# Patient Record
Sex: MALE | Race: OTHER
[De-identification: names, ages, dates, MRNs, and addresses within clinical notes are randomized per-mention and may not be internally consistent; named-entity substitution may affect disease eponyms.]

---

## 2019-01-25 ENCOUNTER — LABORATORY RESULT (OUTPATIENT)
Age: 34
End: 2019-01-25

## 2019-01-25 ENCOUNTER — APPOINTMENT (OUTPATIENT)
Dept: INTERNAL MEDICINE | Facility: CLINIC | Age: 34
End: 2019-01-25

## 2019-01-25 ENCOUNTER — OUTPATIENT (OUTPATIENT)
Dept: OUTPATIENT SERVICES | Facility: HOSPITAL | Age: 34
LOS: 1 days | Discharge: HOME | End: 2019-01-25

## 2019-01-25 VITALS
WEIGHT: 194 LBS | HEART RATE: 66 BPM | DIASTOLIC BLOOD PRESSURE: 85 MMHG | SYSTOLIC BLOOD PRESSURE: 135 MMHG | HEIGHT: 62 IN | BODY MASS INDEX: 35.7 KG/M2

## 2019-01-25 DIAGNOSIS — Z78.9 OTHER SPECIFIED HEALTH STATUS: ICD-10-CM

## 2019-01-25 DIAGNOSIS — Z23 ENCOUNTER FOR IMMUNIZATION: ICD-10-CM

## 2019-01-25 DIAGNOSIS — G62.9 POLYNEUROPATHY, UNSPECIFIED: ICD-10-CM

## 2019-01-25 DIAGNOSIS — R53.83 OTHER FATIGUE: ICD-10-CM

## 2019-01-25 DIAGNOSIS — L85.3 XEROSIS CUTIS: ICD-10-CM

## 2019-01-25 DIAGNOSIS — Z00.00 ENCOUNTER FOR GENERAL ADULT MEDICAL EXAMINATION WITHOUT ABNORMAL FINDINGS: ICD-10-CM

## 2019-01-25 NOTE — REVIEW OF SYSTEMS
[Negative] : Heme/Lymph [Skin Rash] : skin rash [FreeTextEntry2] : lethargy [de-identified] : peripheral neuropathy

## 2019-01-25 NOTE — ASSESSMENT
[FreeTextEntry1] : 33 y.o. f presents for initial evaluation. Patient presents with a chief complaint of dry skin on head, around penis, and butt crack x6 months.\par Patient also admits to being very lethargic lately x1 month. It is associated with bilateral feet swelling. Patient also admits to some shortness of breath on exertion. In addition, also complains of bilateral foot numbness with burning that also started at the same time.\par \par #) Dry skin likely psoriasis\par -Derm referral given\par -clobetasol propionate 0.05% given for scalp to be applied at bedtime x10 days\par -lotrisone given for intertriginous areas to be applied daily x10 days\par \par #) Lethargy/ SOB a/w exertion/ b/l foot swelling\par -STOP -BANG- snores, tired, observed episodes of apnea, neck circumference>15cm, male- 5 points indicative of high risk\par -pulm referral given for sleep study to r/o RITA\par -2D echo ordered to r/o regional wall abnormalities\par -EKG ordered\par -CXR ordered\par \par #) Numbness in b/l feet\par -A1c ordered to r/o diabetic peripheral neuropathy\par -B12, folate ordered to r/o cause of neuropathy\par -neuro exam- sensation b/l intact, reflexes 2+ b/l, strength 5/5 in all extremities\par \par #) HCM\par -BP- 135/85\par -flu shot given\par -routine b/w ordered- cbc, cmp, a1c, tsh, lipid profile, and vitamin D\par -RTC in 3 months

## 2019-01-25 NOTE — PHYSICAL EXAM
[Normal] : normal gait, coordination grossly intact, no focal deficits [de-identified] : salmon pink and scaly rash at buttocks, fingers, and around penis [de-identified] : sensation b/l intact, reflexes 2+ b/l, strength 5/5 in RUE/RLE/LUE/LLE [de-identified] : appropriate, cooperative

## 2019-01-25 NOTE — HISTORY OF PRESENT ILLNESS
[de-identified] : 33 y.o. f presents for initial evaluation. Patient presents with a chief complaint of dry skin on head, around penis, and butt crack. This started about 6 months ago, started with his hands. 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 The rash then popped up in the head, around penis, and in buttocks. Patient also admits to itchiness and redness in area. This has never happened before. Patient denies anyone in the family with a similar rash. The rash has been progressively worsening, spreading to different areas and getting bigger. \par \par Patient also admits to being very lethargic lately. This started about a month ago. It is associated with bilateral feet swelling. Patient also admits to some shortness of breath on exertion. In addition, also complains of bilateral foot numbness with burning that also started at the same time.\par \par Patient denies any fevers, chills, night sweats, and unintentional weight loss. Patient denies any chest pain, shortness of breath, and palpitations. Patient denies any nausea, vomiting, and diarrhea. Patient denies any unintentional weight loss.

## 2019-01-28 LAB
25(OH)D3 SERPL-MCNC: 15 NG/ML
ALBUMIN SERPL ELPH-MCNC: 4.3 G/DL
ALP BLD-CCNC: 100 U/L
ALT SERPL-CCNC: 50 U/L
ANION GAP SERPL CALC-SCNC: 13 MMOL/L
AST SERPL-CCNC: 25 U/L
BASOPHILS # BLD AUTO: 0.04 K/UL
BASOPHILS NFR BLD AUTO: 0.4 %
BILIRUB SERPL-MCNC: 0.3 MG/DL
BUN SERPL-MCNC: 13 MG/DL
CALCIUM SERPL-MCNC: 9.3 MG/DL
CHLORIDE SERPL-SCNC: 100 MMOL/L
CHOLEST SERPL-MCNC: 185 MG/DL
CHOLEST/HDLC SERPL: 5.4 RATIO
CO2 SERPL-SCNC: 29 MMOL/L
CREAT SERPL-MCNC: 0.8 MG/DL
EOSINOPHIL # BLD AUTO: 0.16 K/UL
EOSINOPHIL NFR BLD AUTO: 1.8 %
FOLATE SERPL-MCNC: 14.1 NG/ML
GLUCOSE SERPL-MCNC: 108 MG/DL
HCT VFR BLD CALC: 47.6 %
HDLC SERPL-MCNC: 34 MG/DL
HGB BLD-MCNC: 15.5 G/DL
IMM GRANULOCYTES NFR BLD AUTO: 0.6 %
LDLC SERPL CALC-MCNC: 131 MG/DL
LYMPHOCYTES # BLD AUTO: 2.65 K/UL
LYMPHOCYTES NFR BLD AUTO: 29.6 %
MAN DIFF?: NORMAL
MCHC RBC-ENTMCNC: 29.5 PG
MCHC RBC-ENTMCNC: 32.6 G/DL
MCV RBC AUTO: 90.5 FL
MONOCYTES # BLD AUTO: 0.43 K/UL
MONOCYTES NFR BLD AUTO: 4.8 %
NEUTROPHILS # BLD AUTO: 5.61 K/UL
NEUTROPHILS NFR BLD AUTO: 62.8 %
PLATELET # BLD AUTO: 330 K/UL
POTASSIUM SERPL-SCNC: 4.7 MMOL/L
PROT SERPL-MCNC: 6.9 G/DL
RBC # BLD: 5.26 M/UL
RBC # FLD: 13.1 %
SODIUM SERPL-SCNC: 142 MMOL/L
TRIGL SERPL-MCNC: 278 MG/DL
VIT B12 SERPL-MCNC: 1086 PG/ML
WBC # FLD AUTO: 8.94 K/UL

## 2019-04-26 ENCOUNTER — APPOINTMENT (OUTPATIENT)
Dept: PULMONOLOGY | Facility: CLINIC | Age: 34
End: 2019-04-26

## 2019-04-30 ENCOUNTER — APPOINTMENT (OUTPATIENT)
Dept: INTERNAL MEDICINE | Facility: CLINIC | Age: 34
End: 2019-04-30

## 2019-05-14 ENCOUNTER — APPOINTMENT (OUTPATIENT)
Dept: DERMATOLOGY | Facility: CLINIC | Age: 34
End: 2019-05-14

## 2021-05-17 ENCOUNTER — APPOINTMENT (OUTPATIENT)
Dept: INTERNAL MEDICINE | Facility: CLINIC | Age: 36
End: 2021-05-17
Payer: SUBSIDIZED

## 2021-05-17 ENCOUNTER — OUTPATIENT (OUTPATIENT)
Dept: OUTPATIENT SERVICES | Facility: HOSPITAL | Age: 36
LOS: 1 days | Discharge: HOME | End: 2021-05-17

## 2021-05-17 VITALS
HEART RATE: 73 BPM | WEIGHT: 163 LBS | HEIGHT: 62 IN | OXYGEN SATURATION: 98 % | SYSTOLIC BLOOD PRESSURE: 122 MMHG | BODY MASS INDEX: 30 KG/M2 | TEMPERATURE: 97.5 F | DIASTOLIC BLOOD PRESSURE: 79 MMHG

## 2021-05-17 DIAGNOSIS — Z78.9 OTHER SPECIFIED HEALTH STATUS: ICD-10-CM

## 2021-05-17 DIAGNOSIS — Z83.3 FAMILY HISTORY OF DIABETES MELLITUS: ICD-10-CM

## 2021-05-17 PROCEDURE — 99213 OFFICE O/P EST LOW 20 MIN: CPT | Mod: GC

## 2021-05-17 RX ORDER — KETOCONAZOLE 20.5 MG/ML
2 SHAMPOO, SUSPENSION TOPICAL
Qty: 1 | Refills: 3 | Status: ACTIVE | COMMUNITY
Start: 2021-05-17 | End: 1900-01-01

## 2021-05-17 RX ORDER — KETOCONAZOLE 20 MG/G
2 CREAM TOPICAL TWICE DAILY
Qty: 1 | Refills: 1 | Status: ACTIVE | COMMUNITY
Start: 2021-05-17 | End: 1900-01-01

## 2021-05-19 DIAGNOSIS — Z83.3 FAMILY HISTORY OF DIABETES MELLITUS: ICD-10-CM

## 2021-05-19 DIAGNOSIS — L21.9 SEBORRHEIC DERMATITIS, UNSPECIFIED: ICD-10-CM

## 2021-05-19 DIAGNOSIS — Z00.00 ENCOUNTER FOR GENERAL ADULT MEDICAL EXAMINATION WITHOUT ABNORMAL FINDINGS: ICD-10-CM

## 2021-05-19 NOTE — PLAN
[FreeTextEntry1] : #) seborrheic dermatitis\par -Ketocanozole shampoo 2% prescribed \par -ketocanozole cream 2% given for intertriginous areas and around penis \par \par #) Low back pain \par - can use OTC motrin and stretching exercise after work\par -rtc in 3 weeks for follow up

## 2021-05-19 NOTE — PHYSICAL EXAM
[No Acute Distress] : no acute distress [Well Nourished] : well nourished [Well Developed] : well developed [No Respiratory Distress] : no respiratory distress  [No Accessory Muscle Use] : no accessory muscle use [Clear to Auscultation] : lungs were clear to auscultation bilaterally [Normal Rate] : normal rate  [Regular Rhythm] : with a regular rhythm [de-identified] : sensation b/l intact, reflexes 2+ b/l, strength 5/5 in RUE/RLE/LUE/LLE [de-identified] : appropriate, cooperative [Normal] : no CVA or spinal tenderness [de-identified] : red flaky rash on scalp and erythema on glans penis

## 2021-05-19 NOTE — ASSESSMENT
[FreeTextEntry1] : 35 y.o. male presents medical follow up with a complaint of red itchy flaky rash on scalp and on penis . This problem is recurrent for patient and reappeared about 1 month ago.\par \par He also complains of lower back back pain.  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 \par \par \par \par #) seborrheic dermatitis\par -Ketocanozole shampoo 2% prescribed \par -ketocanozole cream 2% given for glans penis \par \par #) Low back pain \par - can use OTC motrin \par - Patient counseled on stretching exercises after work\par -rtc in 3 weeks for follow up

## 2021-05-19 NOTE — HISTORY OF PRESENT ILLNESS
[FreeTextEntry1] : follow up  [de-identified] : 35 y.o. male presents medical follow up with a complaint of red itchy flaky rash on scalp and on penis . This problem is recurrent for patient and reappeared about 1 month ago.\par \par He also complains of lower back back pain.  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

## 2021-06-02 LAB
ALBUMIN SERPL ELPH-MCNC: 3.8 G/DL
ALP BLD-CCNC: 120 U/L
ALT SERPL-CCNC: <5 U/L
ANION GAP SERPL CALC-SCNC: 12 MMOL/L
AST SERPL-CCNC: <5 U/L
BASOPHILS # BLD AUTO: 0.03 K/UL
BASOPHILS NFR BLD AUTO: 0.4 %
BILIRUB SERPL-MCNC: 0.3 MG/DL
BUN SERPL-MCNC: 15 MG/DL
CALCIUM SERPL-MCNC: 9.3 MG/DL
CHLORIDE SERPL-SCNC: 95 MMOL/L
CHOLEST SERPL-MCNC: 381 MG/DL
CO2 SERPL-SCNC: 25 MMOL/L
CREAT SERPL-MCNC: <0.5 MG/DL
EOSINOPHIL # BLD AUTO: 0.17 K/UL
EOSINOPHIL NFR BLD AUTO: 2.2 %
ESTIMATED AVERAGE GLUCOSE: 306 MG/DL
GLUCOSE SERPL-MCNC: 344 MG/DL
HBA1C MFR BLD HPLC: 12.3 %
HCT VFR BLD CALC: 44.8 %
HDLC SERPL-MCNC: 18 MG/DL
HGB BLD-MCNC: 16.5 G/DL
IMM GRANULOCYTES NFR BLD AUTO: 0.5 %
LDLC SERPL CALC-MCNC: 42 MG/DL
LYMPHOCYTES # BLD AUTO: 2.83 K/UL
LYMPHOCYTES NFR BLD AUTO: 37.2 %
MAN DIFF?: NORMAL
MCHC RBC-ENTMCNC: 32 PG
MCHC RBC-ENTMCNC: 36.8 G/DL
MCV RBC AUTO: 87 FL
MONOCYTES # BLD AUTO: 0.46 K/UL
MONOCYTES NFR BLD AUTO: 6.1 %
NEUTROPHILS # BLD AUTO: 4.07 K/UL
NEUTROPHILS NFR BLD AUTO: 53.6 %
NONHDLC SERPL-MCNC: 363 MG/DL
PLATELET # BLD AUTO: 290 K/UL
POTASSIUM SERPL-SCNC: 4.5 MMOL/L
PROT SERPL-MCNC: 6.7 G/DL
RBC # BLD: 5.15 M/UL
RBC # FLD: 13.1 %
SODIUM SERPL-SCNC: 132 MMOL/L
T4 FREE SERPL-MCNC: 1.3 NG/DL
TRIGL SERPL-MCNC: 3998 MG/DL
TSH SERPL-ACNC: 3.72 UIU/ML
WBC # FLD AUTO: 7.6 K/UL

## 2021-06-16 ENCOUNTER — APPOINTMENT (OUTPATIENT)
Dept: INTERNAL MEDICINE | Facility: CLINIC | Age: 36
End: 2021-06-16
Payer: SUBSIDIZED

## 2021-06-16 ENCOUNTER — NON-APPOINTMENT (OUTPATIENT)
Age: 36
End: 2021-06-16

## 2021-06-16 ENCOUNTER — OUTPATIENT (OUTPATIENT)
Dept: OUTPATIENT SERVICES | Facility: HOSPITAL | Age: 36
LOS: 1 days | Discharge: HOME | End: 2021-06-16

## 2021-06-16 VITALS
WEIGHT: 163 LBS | HEIGHT: 62 IN | TEMPERATURE: 96.8 F | OXYGEN SATURATION: 97 % | BODY MASS INDEX: 30 KG/M2 | DIASTOLIC BLOOD PRESSURE: 70 MMHG | SYSTOLIC BLOOD PRESSURE: 110 MMHG | HEART RATE: 77 BPM

## 2021-06-16 DIAGNOSIS — E78.5 HYPERLIPIDEMIA, UNSPECIFIED: ICD-10-CM

## 2021-06-16 PROCEDURE — 99213 OFFICE O/P EST LOW 20 MIN: CPT | Mod: GC

## 2021-06-16 RX ORDER — CLOBETASOL PROPIONATE 0.5 MG/G
0.05 CREAM TOPICAL TWICE DAILY
Qty: 1 | Refills: 0 | Status: COMPLETED | COMMUNITY
Start: 2019-01-25 | End: 2021-06-16

## 2021-06-16 RX ORDER — CLOTRIMAZOLE AND BETAMETHASONE DIPROPIONATE 10; .5 MG/G; MG/G
1-0.05 CREAM TOPICAL
Qty: 1 | Refills: 0 | Status: COMPLETED | COMMUNITY
Start: 2019-01-25 | End: 2021-06-16

## 2021-06-17 DIAGNOSIS — E78.5 HYPERLIPIDEMIA, UNSPECIFIED: ICD-10-CM

## 2021-06-17 DIAGNOSIS — E11.9 TYPE 2 DIABETES MELLITUS WITHOUT COMPLICATIONS: ICD-10-CM

## 2021-06-23 LAB
CHOLEST SERPL-MCNC: 276 MG/DL
ESTIMATED AVERAGE GLUCOSE: 309 MG/DL
HBA1C MFR BLD HPLC: 12.4 %
HDLC SERPL-MCNC: 22 MG/DL
LDLC SERPL CALC-MCNC: 62 MG/DL
NONHDLC SERPL-MCNC: 254 MG/DL
TRIGL SERPL-MCNC: 2070 MG/DL

## 2021-06-30 NOTE — PHYSICAL EXAM
[Well Nourished] : well nourished [No Acute Distress] : no acute distress [Well Developed] : well developed [No Respiratory Distress] : no respiratory distress  [No Accessory Muscle Use] : no accessory muscle use [Clear to Auscultation] : lungs were clear to auscultation bilaterally [Normal Rate] : normal rate  [Regular Rhythm] : with a regular rhythm [Normal] : no CVA or spinal tenderness [de-identified] : red flaky rash on scalp and erythema on glans penis

## 2021-06-30 NOTE — PHYSICAL EXAM
[Well Nourished] : well nourished [No Acute Distress] : no acute distress [Well Developed] : well developed [No Respiratory Distress] : no respiratory distress  [No Accessory Muscle Use] : no accessory muscle use [Clear to Auscultation] : lungs were clear to auscultation bilaterally [Normal Rate] : normal rate  [Regular Rhythm] : with a regular rhythm [Normal] : no CVA or spinal tenderness [de-identified] : red flaky rash on scalp and erythema on glans penis

## 2021-06-30 NOTE — HISTORY OF PRESENT ILLNESS
[FreeTextEntry1] : medical follow up [de-identified] : 36 y.o. male presents medical follow up with  complaint of red itchy flaky rash on scalp and on penis It has improved since last visit, but did not disappear completely\par \par Patient is here to discuss lab results.

## 2021-06-30 NOTE — ASSESSMENT
[FreeTextEntry1] : 36 y.o. male presents medical follow up with a complaint of red itchy flaky rash on scalp and on penis improved with ketoconazole.\par  \par \par #) seborrheic dermatitis\par -Ketocanozole shampoo 2% prescribed \par -ketocanozole cream 2% \par Refer to dermatology\par \par Labs noted. \par Hgb A1C is 12.3 Hgb A1C was 6.4 in 2019\par Trigl 3997 mg/dl\par Chol 381\par HDL 18\par \par Patient will be repeating his labs this week. If results are true, we will start treatment for DM and hyperlipidemia.

## 2021-06-30 NOTE — HISTORY OF PRESENT ILLNESS
[FreeTextEntry1] : medical follow up [de-identified] : 36 y.o. male presents medical follow up with  complaint of red itchy flaky rash on scalp and on penis It has improved since last visit, but did not disappear completely\par \par Patient is here to discuss lab results.

## 2021-07-01 PROBLEM — E78.5 HYPERLIPIDEMIA: Status: ACTIVE | Noted: 2021-07-01

## 2021-07-02 ENCOUNTER — NON-APPOINTMENT (OUTPATIENT)
Age: 36
End: 2021-07-02

## 2021-07-06 ENCOUNTER — NON-APPOINTMENT (OUTPATIENT)
Age: 36
End: 2021-07-06

## 2021-07-08 ENCOUNTER — NON-APPOINTMENT (OUTPATIENT)
Age: 36
End: 2021-07-08

## 2021-07-09 ENCOUNTER — APPOINTMENT (OUTPATIENT)
Dept: NUTRITION | Facility: CLINIC | Age: 36
End: 2021-07-09

## 2021-09-23 ENCOUNTER — OUTPATIENT (OUTPATIENT)
Dept: OUTPATIENT SERVICES | Facility: HOSPITAL | Age: 36
LOS: 1 days | Discharge: HOME | End: 2021-09-23

## 2021-09-23 DIAGNOSIS — Z20.828 CONTACT WITH AND (SUSPECTED) EXPOSURE TO OTHER VIRAL COMMUNICABLE DISEASES: ICD-10-CM

## 2021-09-23 LAB — SARS-COV-2 RNA SPEC QL NAA+PROBE: SIGNIFICANT CHANGE UP

## 2021-10-04 ENCOUNTER — APPOINTMENT (OUTPATIENT)
Dept: INTERNAL MEDICINE | Facility: CLINIC | Age: 36
End: 2021-10-04

## 2021-10-12 ENCOUNTER — APPOINTMENT (OUTPATIENT)
Dept: DERMATOLOGY | Facility: CLINIC | Age: 36
End: 2021-10-12

## 2022-03-28 ENCOUNTER — APPOINTMENT (OUTPATIENT)
Dept: NUTRITION | Facility: CLINIC | Age: 37
End: 2022-03-28

## 2022-04-10 ENCOUNTER — EMERGENCY (EMERGENCY)
Facility: HOSPITAL | Age: 37
LOS: 1 days | Discharge: HOME | End: 2022-04-12
Attending: EMERGENCY MEDICINE | Admitting: EMERGENCY MEDICINE
Payer: SUBSIDIZED

## 2022-04-10 VITALS
HEART RATE: 73 BPM | DIASTOLIC BLOOD PRESSURE: 86 MMHG | WEIGHT: 156.97 LBS | SYSTOLIC BLOOD PRESSURE: 141 MMHG | OXYGEN SATURATION: 99 % | RESPIRATION RATE: 18 BRPM | TEMPERATURE: 98 F

## 2022-04-10 DIAGNOSIS — R11.0 NAUSEA: ICD-10-CM

## 2022-04-10 DIAGNOSIS — R07.89 OTHER CHEST PAIN: ICD-10-CM

## 2022-04-10 DIAGNOSIS — E78.00 PURE HYPERCHOLESTEROLEMIA, UNSPECIFIED: ICD-10-CM

## 2022-04-10 DIAGNOSIS — R94.31 ABNORMAL ELECTROCARDIOGRAM [ECG] [EKG]: ICD-10-CM

## 2022-04-10 DIAGNOSIS — Z20.822 CONTACT WITH AND (SUSPECTED) EXPOSURE TO COVID-19: ICD-10-CM

## 2022-04-10 DIAGNOSIS — R42 DIZZINESS AND GIDDINESS: ICD-10-CM

## 2022-04-10 DIAGNOSIS — E11.65 TYPE 2 DIABETES MELLITUS WITH HYPERGLYCEMIA: ICD-10-CM

## 2022-04-10 LAB
BASOPHILS # BLD AUTO: 0.02 K/UL — SIGNIFICANT CHANGE UP (ref 0–0.2)
BASOPHILS NFR BLD AUTO: 0.3 % — SIGNIFICANT CHANGE UP (ref 0–1)
EOSINOPHIL # BLD AUTO: 0.14 K/UL — SIGNIFICANT CHANGE UP (ref 0–0.7)
EOSINOPHIL NFR BLD AUTO: 2 % — SIGNIFICANT CHANGE UP (ref 0–8)
HCT VFR BLD CALC: 42.7 % — SIGNIFICANT CHANGE UP (ref 42–52)
HGB BLD-MCNC: 14.9 G/DL — SIGNIFICANT CHANGE UP (ref 14–18)
IMM GRANULOCYTES NFR BLD AUTO: 0.3 % — SIGNIFICANT CHANGE UP (ref 0.1–0.3)
INR BLD: 1.01 RATIO — SIGNIFICANT CHANGE UP (ref 0.65–1.3)
LYMPHOCYTES # BLD AUTO: 2.73 K/UL — SIGNIFICANT CHANGE UP (ref 1.2–3.4)
LYMPHOCYTES # BLD AUTO: 39.3 % — SIGNIFICANT CHANGE UP (ref 20.5–51.1)
MCHC RBC-ENTMCNC: 30.7 PG — SIGNIFICANT CHANGE UP (ref 27–31)
MCHC RBC-ENTMCNC: 34.9 G/DL — SIGNIFICANT CHANGE UP (ref 32–37)
MCV RBC AUTO: 87.9 FL — SIGNIFICANT CHANGE UP (ref 80–94)
MONOCYTES # BLD AUTO: 0.31 K/UL — SIGNIFICANT CHANGE UP (ref 0.1–0.6)
MONOCYTES NFR BLD AUTO: 4.5 % — SIGNIFICANT CHANGE UP (ref 1.7–9.3)
NEUTROPHILS # BLD AUTO: 3.73 K/UL — SIGNIFICANT CHANGE UP (ref 1.4–6.5)
NEUTROPHILS NFR BLD AUTO: 53.6 % — SIGNIFICANT CHANGE UP (ref 42.2–75.2)
NRBC # BLD: 0 /100 WBCS — SIGNIFICANT CHANGE UP (ref 0–0)
PLATELET # BLD AUTO: 271 K/UL — SIGNIFICANT CHANGE UP (ref 130–400)
PROTHROM AB SERPL-ACNC: 11.6 SEC — SIGNIFICANT CHANGE UP (ref 9.95–12.87)
RBC # BLD: 4.86 M/UL — SIGNIFICANT CHANGE UP (ref 4.7–6.1)
RBC # FLD: 12.4 % — SIGNIFICANT CHANGE UP (ref 11.5–14.5)
WBC # BLD: 6.95 K/UL — SIGNIFICANT CHANGE UP (ref 4.8–10.8)
WBC # FLD AUTO: 6.95 K/UL — SIGNIFICANT CHANGE UP (ref 4.8–10.8)

## 2022-04-10 PROCEDURE — 99285 EMERGENCY DEPT VISIT HI MDM: CPT

## 2022-04-10 PROCEDURE — 99283 EMERGENCY DEPT VISIT LOW MDM: CPT

## 2022-04-10 RX ORDER — SODIUM CHLORIDE 9 MG/ML
1000 INJECTION INTRAMUSCULAR; INTRAVENOUS; SUBCUTANEOUS ONCE
Refills: 0 | Status: COMPLETED | OUTPATIENT
Start: 2022-04-10 | End: 2022-04-10

## 2022-04-10 RX ORDER — MECLIZINE HCL 12.5 MG
50 TABLET ORAL ONCE
Refills: 0 | Status: COMPLETED | OUTPATIENT
Start: 2022-04-10 | End: 2022-04-10

## 2022-04-10 RX ADMIN — SODIUM CHLORIDE 1000 MILLILITER(S): 9 INJECTION INTRAMUSCULAR; INTRAVENOUS; SUBCUTANEOUS at 23:21

## 2022-04-10 RX ADMIN — Medication 50 MILLIGRAM(S): at 23:24

## 2022-04-10 NOTE — ED ADULT TRIAGE NOTE - CHIEF COMPLAINT QUOTE
pt has dizziness described as everything spinning around him aw nausea and cold sweats, sleepiness staring yesterday denies abd pain, vision changes, fevers.

## 2022-04-10 NOTE — ED ADULT NURSE NOTE - OBJECTIVE STATEMENT
patient complaints of dizziness and nausea x 1 day. Patient denies chest pain, vomiting, fever, SOB.

## 2022-04-10 NOTE — ED ADULT TRIAGE NOTE - ESI TRIAGE ACUITY LEVEL, MLM
3
Quality 110: Preventive Care And Screening: Influenza Immunization: Influenza Immunization not Administered because Patient Refused.
Detail Level: Detailed

## 2022-04-11 LAB
A1C WITH ESTIMATED AVERAGE GLUCOSE RESULT: 11.9 % — HIGH (ref 4–5.6)
ALBUMIN SERPL ELPH-MCNC: 4.2 G/DL — SIGNIFICANT CHANGE UP (ref 3.5–5.2)
ALP SERPL-CCNC: 98 U/L — SIGNIFICANT CHANGE UP (ref 30–115)
ALT FLD-CCNC: 36 U/L — SIGNIFICANT CHANGE UP (ref 0–41)
ANION GAP SERPL CALC-SCNC: 9 MMOL/L — SIGNIFICANT CHANGE UP (ref 7–14)
APTT BLD: 32.2 SEC — SIGNIFICANT CHANGE UP (ref 27–39.2)
AST SERPL-CCNC: 17 U/L — SIGNIFICANT CHANGE UP (ref 0–41)
BILIRUB DIRECT SERPL-MCNC: <0.2 MG/DL — SIGNIFICANT CHANGE UP (ref 0–0.3)
BILIRUB INDIRECT FLD-MCNC: SIGNIFICANT CHANGE UP MG/DL (ref 0.2–1.2)
BILIRUB SERPL-MCNC: 0.3 MG/DL — SIGNIFICANT CHANGE UP (ref 0.2–1.2)
BUN SERPL-MCNC: 16 MG/DL — SIGNIFICANT CHANGE UP (ref 10–20)
CALCIUM SERPL-MCNC: 9.4 MG/DL — SIGNIFICANT CHANGE UP (ref 8.5–10.1)
CHLORIDE SERPL-SCNC: 99 MMOL/L — SIGNIFICANT CHANGE UP (ref 98–110)
CHOLEST SERPL-MCNC: 249 MG/DL — HIGH
CO2 SERPL-SCNC: 27 MMOL/L — SIGNIFICANT CHANGE UP (ref 17–32)
CREAT SERPL-MCNC: 0.6 MG/DL — LOW (ref 0.7–1.5)
D DIMER BLD IA.RAPID-MCNC: <150 NG/ML DDU — SIGNIFICANT CHANGE UP (ref 0–230)
EGFR: 129 ML/MIN/1.73M2 — SIGNIFICANT CHANGE UP
ESTIMATED AVERAGE GLUCOSE: 295 MG/DL — HIGH (ref 68–114)
GLUCOSE SERPL-MCNC: 305 MG/DL — HIGH (ref 70–99)
HDLC SERPL-MCNC: 27 MG/DL — LOW
LIPID PNL WITH DIRECT LDL SERPL: 100 MG/DL — HIGH
MAGNESIUM SERPL-MCNC: 2 MG/DL — SIGNIFICANT CHANGE UP (ref 1.8–2.4)
NON HDL CHOLESTEROL: 222 MG/DL — HIGH
POTASSIUM SERPL-MCNC: 4.6 MMOL/L — SIGNIFICANT CHANGE UP (ref 3.5–5)
POTASSIUM SERPL-SCNC: 4.6 MMOL/L — SIGNIFICANT CHANGE UP (ref 3.5–5)
PROT SERPL-MCNC: 6.1 G/DL — SIGNIFICANT CHANGE UP (ref 6–8)
SARS-COV-2 RNA SPEC QL NAA+PROBE: SIGNIFICANT CHANGE UP
SODIUM SERPL-SCNC: 135 MMOL/L — SIGNIFICANT CHANGE UP (ref 135–146)
TRIGL SERPL-MCNC: 1322 MG/DL — HIGH
TROPONIN T SERPL-MCNC: <0.01 NG/ML — SIGNIFICANT CHANGE UP
TROPONIN T SERPL-MCNC: <0.01 NG/ML — SIGNIFICANT CHANGE UP

## 2022-04-11 PROCEDURE — 70450 CT HEAD/BRAIN W/O DYE: CPT | Mod: 26,59,MA

## 2022-04-11 PROCEDURE — 78452 HT MUSCLE IMAGE SPECT MULT: CPT | Mod: 26,MA

## 2022-04-11 PROCEDURE — 99220: CPT

## 2022-04-11 PROCEDURE — 70551 MRI BRAIN STEM W/O DYE: CPT | Mod: 26,MA

## 2022-04-11 PROCEDURE — 70496 CT ANGIOGRAPHY HEAD: CPT | Mod: 26,MA

## 2022-04-11 PROCEDURE — 71045 X-RAY EXAM CHEST 1 VIEW: CPT | Mod: 26

## 2022-04-11 PROCEDURE — 93018 CV STRESS TEST I&R ONLY: CPT

## 2022-04-11 PROCEDURE — 93016 CV STRESS TEST SUPVJ ONLY: CPT

## 2022-04-11 PROCEDURE — 70498 CT ANGIOGRAPHY NECK: CPT | Mod: 26,MA

## 2022-04-11 PROCEDURE — 93010 ELECTROCARDIOGRAM REPORT: CPT | Mod: 76

## 2022-04-11 PROCEDURE — 93306 TTE W/DOPPLER COMPLETE: CPT | Mod: 26

## 2022-04-11 RX ORDER — SODIUM CHLORIDE 9 MG/ML
1000 INJECTION INTRAMUSCULAR; INTRAVENOUS; SUBCUTANEOUS ONCE
Refills: 0 | Status: COMPLETED | OUTPATIENT
Start: 2022-04-11 | End: 2022-04-11

## 2022-04-11 RX ORDER — ASPIRIN/CALCIUM CARB/MAGNESIUM 324 MG
324 TABLET ORAL ONCE
Refills: 0 | Status: COMPLETED | OUTPATIENT
Start: 2022-04-11 | End: 2022-04-11

## 2022-04-11 RX ORDER — ATORVASTATIN CALCIUM 80 MG/1
80 TABLET, FILM COATED ORAL ONCE
Refills: 0 | Status: COMPLETED | OUTPATIENT
Start: 2022-04-11 | End: 2022-04-11

## 2022-04-11 RX ORDER — PROCHLORPERAZINE MALEATE 5 MG
10 TABLET ORAL ONCE
Refills: 0 | Status: COMPLETED | OUTPATIENT
Start: 2022-04-11 | End: 2022-04-11

## 2022-04-11 RX ORDER — ADENOSINE 3 MG/ML
60 INJECTION INTRAVENOUS ONCE
Refills: 0 | Status: COMPLETED | OUTPATIENT
Start: 2022-04-11 | End: 2022-04-11

## 2022-04-11 RX ADMIN — SODIUM CHLORIDE 1000 MILLILITER(S): 9 INJECTION INTRAMUSCULAR; INTRAVENOUS; SUBCUTANEOUS at 04:30

## 2022-04-11 RX ADMIN — Medication 324 MILLIGRAM(S): at 06:29

## 2022-04-11 RX ADMIN — ATORVASTATIN CALCIUM 80 MILLIGRAM(S): 80 TABLET, FILM COATED ORAL at 14:17

## 2022-04-11 RX ADMIN — ADENOSINE 600 MILLIGRAM(S): 3 INJECTION INTRAVENOUS at 17:54

## 2022-04-11 RX ADMIN — SODIUM CHLORIDE 1000 MILLILITER(S): 9 INJECTION INTRAMUSCULAR; INTRAVENOUS; SUBCUTANEOUS at 01:03

## 2022-04-11 RX ADMIN — Medication 104 MILLIGRAM(S): at 06:30

## 2022-04-11 NOTE — ED ADULT NURSE REASSESSMENT NOTE - NS ED NURSE REASSESS COMMENT FT1
Assumed care from previous nurse  Niru c/o dizziness with nausea . Pt AO x 4 , clear speech , sensation intact, moves all extremities , denies nausea this time , no vomiting noted , follows command , IVL intact . v/s stable , afebrile , on continuous cardiac monitor , OBSERVATION status ,no syncopal episode

## 2022-04-11 NOTE — CONSULT NOTE ADULT - ASSESSMENT
35 yo RHM with h/o dm was prescribed medication but stopped taking it a year ago due to side effects as per patient, mRs 0 p/w acute onset room spinning dizziness starting 2 days ago. He states that the dizziness is constant and does not change with position and is a/w nausea but no vomiting. He denies focal weakness numbness, speech visual deficits, ear pain tinnitus or other complaints. Denies previous similar episodes. Has fast beating nystagmus to the left . CTH CTA H/N is negative for acute findings. NIHSS 0     #Vestibular neuronitis Vs posterior circulation stroke    Plan  -Admit to obs  -Telemonitoring  -N/C MRI BRAIN  -ECHO  -Start Lipitor 80 mg q 24  -Start ASA 81 MG Q 24  -Start Meclizine 12.5 q 8 prn  -Lipid profile, hbaic EKG  -Neuro attending note will follow

## 2022-04-11 NOTE — ED ADULT NURSE REASSESSMENT NOTE - NS ED NURSE REASSESS COMMENT FT1
Pt assessed A/O times 4 Vs stable on cardiac monitor denies chest pain denies N/V report filling dizzy , comfort provide pt with family members at bed site safety precaution on progress ,ready to be transport for MRI with transporter.

## 2022-04-11 NOTE — ED CDU PROVIDER INITIAL DAY NOTE - NS ED ROS FT
Constitutional: no fever, chills, no recent weight loss, change in appetite or malaise  Eyes: no redness/discharge/pain/vision changes  ENT: no rhinorrhea/ear pain/sore throat  Cardiac: see HPI  Respiratory: No cough or respiratory distress  GI: No nausea, vomiting, diarrhea or abdominal pain.  : No dysuria, frequency, urgency or hematuria  MS: no pain to back or extremities, no loss of ROM, no weakness  Neuro: see HPI  Skin: No skin rash.  Endocrine: + diabetes.

## 2022-04-11 NOTE — ED PROVIDER NOTE - CLINICAL SUMMARY MEDICAL DECISION MAKING FREE TEXT BOX
Patient with Vertigo x 2 days, no neurologic deficits, did not respond to meclizine, abnormal EKG, and risk factors for cardiovascular disease. Neurology on call consulted, and recommended to have patient placed in OBS for MRI.

## 2022-04-11 NOTE — ED PROVIDER NOTE - PHYSICAL EXAMINATION
Patient is awake, alert, sitting comfortably on the examination table and answering questions appropriately.   ANMOL/EOMI, fatigable nystagmus with fast component to Lt side.   B/L TM has no erythema, no discharge, +light reflex, land marks seen.   supple neck, no pain on ROM Of the neck.

## 2022-04-11 NOTE — ED PROVIDER NOTE - OBJECTIVE STATEMENT
Patient is c/o dizziness x 2 days, started on Saturday morning around 10 am. Patient states that, he woke up on Saturday at 10 am feeling dizzy. Patient symptoms continued since then. Dizziness is described as spinning sensation, associated with nausea and intermittent episodes of blurry vision. Patient denies URI symptoms, denies ear pain/ringing in the ear. Patient denies trauma, denies HA, denies abd pain/back pain/neck pain. Patient is also c/o intermittent episodes of chest tightness, no sob. Denies any other associated neurologic symptoms. NO lower ext pain/swelling, no rash. Patient dizziness is better at rest, worse with standing/walking.

## 2022-04-11 NOTE — ED PROVIDER NOTE - PROGRESS NOTE DETAILS
Patient continued with dizziness, did not improved with Meclizine. Patient EKGs reviewed. Discussed with neurology on call, CTA head/neck ordered and placed on OBS unit for further evaluation of his symptoms. Discussed with patient and his family, they verbalized understanding the information and agreed.

## 2022-04-11 NOTE — CONSULT NOTE ADULT - SUBJECTIVE AND OBJECTIVE BOX
CC: Dizziness        HPI:  35 yo RHM with h/o dm was prescribed medication but stopped taking it a year ago due to side effects as per patient, mRs 0 p/w acute onset room spinning dizziness starting 2 days ago. He states that the dizziness is constant and does not change with position and is a/w nausea but no vomiting. He denies focal weakness numbness, speech visual deficits, ear pain tinnitus or other complaints. Denies previous similar episodes.      Home Medications:  Patient is not taking any routine medications at home      Social History  Denies smoking   Social Alcohol use  Denies drug use    Family history  Mother and father both diabetic    Neuro Exam:  Patient is awake and alert x3 following commands. Normal attention comprehension and repetition.  CN: Intact except for a fast beating nystagmus to the left  Power: 5/5 throughout  FTN: No dysmetria  HKS: No limb ataxia  Sensory: intact  Gait: tried to get the patient to walk but he c/o dizziness  No neglect      NIHSS: 0    LOC:       1a: 0    1b(Questions):   0        1c(Instructions): 0            Best Gaze: 0  Visual: 0  Motor:                 RUE: 0    RLE: 0    LUE:0     LLE:  0   FACE: 0    Limb Ataxia: 0  Sensory:    0   Language:  0     Dysarthria: 0         Extinction and Inattention: 0      Allergies    Allergy Status Unknown    Intolerances      MEDICATIONS  (STANDING):  atorvastatin 80 milliGRAM(s) Oral once    MEDICATIONS  (PRN):      LABS:                        14.9   6.95  )-----------( 271      ( 10 Apr 2022 23:20 )             42.7     04-10    135  |  99  |  16  ----------------------------<  305<H>  4.6   |  27  |  0.6<L>    Ca    9.4      10 Apr 2022 23:20  Mg     2.0     04-10    TPro  6.1  /  Alb  4.2  /  TBili  0.3  /  DBili  <0.2  /  AST  17  /  ALT  36  /  AlkPhos  98  04-10    PT/INR - ( 10 Apr 2022 23:20 )   PT: 11.60 sec;   INR: 1.01 ratio         PTT - ( 10 Apr 2022 23:20 )  PTT:32.2 sec        Neuro Imaging:  < from: CT Head No Cont (04.11.22 @ 00:05) >  Findings:    The ventricles and cortical sulci pattern are age-appropriate.    Gray-white matter differentiation is maintained.    There is no acute intracranial hemorrhage, extra-axial fluid collection   or midline shift.    No acute osseous abnormality/depressed skull fracture is identified.    The visualized paranasal sinuses are well aerated.    Sclerosis at bilateral mastoid tips which may reflect sequelae of chronic   inflammatory change.    IMPRESSION:    No CT evidence for acute intracranial pathology.    --- End of Report ---        JIM GONZALEZ MD; Attending Radiologist  This document has been electronically signed. Apr 11 2022 12:30AM    < end of copied text >      < from: CT Angio Neck w/ IV Cont (04.11.22 @ 05:58) >    FINDINGS:      IMPRESSION:    CTA HEAD:  No evidence of flow-limiting stenosis, occlusion or aneurysm.    CTA NECK:  No evidence of greater than 50% carotid or vertebral artery stenosis.        ******PRELIMINARY REPORT******      ******PRELIMINARY REPORT******       YASMIN ERVIN MD; Resident Radiologist    < end of copied text >        EEG:     Echo:   Carotid Doppler: N/A  Telemetry:

## 2022-04-11 NOTE — ED CDU PROVIDER INITIAL DAY NOTE - OBJECTIVE STATEMENT
37 yo male hx of DM (non-compliance to medication) placed in obs for dizziness and chest pain. as per patient, he started feeling dizziness since yesterday morning. symptoms are constant. position changes and ambulation worsen his symptoms. dizziness associates with nausea and chest pressure so wife brought him to ED for evaluation. denies fever/chill/HA/abd pain/vomiting/diarrhea and urinary sxs.

## 2022-04-11 NOTE — ED PROVIDER NOTE - CARE PLAN
Principal Discharge DX:	Vertigo  Secondary Diagnosis:	Chest pain  Secondary Diagnosis:	Abnormal EKG  Secondary Diagnosis:	Hyperglycemia   1

## 2022-04-11 NOTE — ED CDU PROVIDER INITIAL DAY NOTE - PHYSICAL EXAMINATION
CONSTITUTIONAL: Well-appearing; well-nourished; in no apparent distress.   EYES: PERRL; EOM intact. + horizontal nystagmus to left and upper gaze.   CARDIOVASCULAR: Normal S1, S2; no murmurs, rubs, or gallops.   RESPIRATORY: Normal chest excursion with respiration; breath sounds clear and equal bilaterally; no wheezes, rhonchi, or rales.  GI/: Normal bowel sounds; non-distended; non-tender; no palpable organomegaly.   MS: No calf swelling and tenderness.  SKIN: Normal for age and race; warm; dry; good turgor; no apparent lesions or exudate.   NEURO/PSYCH: A & O x 4; CN II-XII grossly unremarkable. no drifting. strength equal to b/l upper and lower extremities. speaking coherently. nml cerebellum test. ambulate with left sided gait.

## 2022-04-11 NOTE — ED ADULT NURSE REASSESSMENT NOTE - NS ED NURSE REASSESS COMMENT FT1
patient placed in observation and moved to 10A.  Report given to MILLA Cuenca. Patient left for CT scan.  Medication endorsed to receiving RN, waiting for pharmacy to send to unit. Patient in stable condition and nad.

## 2022-04-11 NOTE — ED CDU PROVIDER INITIAL DAY NOTE - NS ED ATTENDING STATEMENT MOD
This was a shared visit with the BLAINE. I reviewed and verified the documentation and independently performed the documented:

## 2022-04-12 VITALS
HEART RATE: 64 BPM | SYSTOLIC BLOOD PRESSURE: 105 MMHG | RESPIRATION RATE: 18 BRPM | OXYGEN SATURATION: 98 % | TEMPERATURE: 97 F | DIASTOLIC BLOOD PRESSURE: 73 MMHG

## 2022-04-12 PROCEDURE — 99217: CPT | Mod: FS

## 2022-04-12 RX ORDER — ATORVASTATIN CALCIUM 80 MG/1
1 TABLET, FILM COATED ORAL
Qty: 30 | Refills: 0
Start: 2022-04-12 | End: 2022-05-11

## 2022-04-12 RX ORDER — ATORVASTATIN CALCIUM 80 MG/1
80 TABLET, FILM COATED ORAL ONCE
Refills: 0 | Status: COMPLETED | OUTPATIENT
Start: 2022-04-12 | End: 2022-04-12

## 2022-04-12 RX ORDER — METFORMIN HYDROCHLORIDE 850 MG/1
1 TABLET ORAL
Qty: 60 | Refills: 0
Start: 2022-04-12 | End: 2022-05-11

## 2022-04-12 RX ORDER — ATORVASTATIN CALCIUM 80 MG/1
40 TABLET, FILM COATED ORAL DAILY
Refills: 0 | Status: DISCONTINUED | OUTPATIENT
Start: 2022-04-12 | End: 2022-04-12

## 2022-04-12 RX ADMIN — ATORVASTATIN CALCIUM 80 MILLIGRAM(S): 80 TABLET, FILM COATED ORAL at 03:59

## 2022-04-12 NOTE — ED CDU PROVIDER DISPOSITION NOTE - PATIENT PORTAL LINK FT
You can access the FollowMyHealth Patient Portal offered by [facility] by registering at the following website: [email or web address]. By joining [employer]’s FollowMyHealth portal, you will also be able to view your health information using other applications (apps) compatible with our system.

## 2022-04-12 NOTE — ED CDU PROVIDER SUBSEQUENT DAY NOTE - PHYSICAL EXAMINATION
Physical Exam    Vital Signs: I have reviewed the initial vital signs.  Constitutional: well-nourished, appears stated age, no acute distress  Eyes: Conjunctiva pink, Sclera clear,   Cardiovascular: S1 and S2, regular rate, regular rhythm, well-perfused extremities, radial pulses equal and 2+  Respiratory: unlabored respiratory effort, clear to auscultation bilaterally no wheezing, rales and rhonchi  Gastrointestinal: soft, non-tender abdomen, no pulsatile mass, normal bowl sounds  Musculoskeletal: supple neck, no lower extremity edema, no midline tenderness  Integumentary: warm, dry, no rash  Neurologic: awake, alert,  nvi

## 2022-04-12 NOTE — ED CDU PROVIDER DISPOSITION NOTE - NSFOLLOWUPCLINICS_GEN_ALL_ED_FT
[facility]  Medicine  [address], [place]   Phone: [phone]  Fax:   Follow Up Time: Urgent

## 2022-04-12 NOTE — ED CDU PROVIDER DISPOSITION NOTE - CARE PROVIDER_API CALL
Graeme Guajardo)  Neurology  [address] [place] [address]  Phone: [phone]  Fax: [phone]  Follow Up Time: Routine    Faiza Rodriguez)  Cardiovascular Disease; Internal Medicine  [address] [place] [address]. [address]  [place] [address]  Phone: [phone]  Fax: [phone]  Follow Up Time: Routine

## 2022-04-12 NOTE — ED CDU PROVIDER DISPOSITION NOTE - NSFOLLOWUPINSTRUCTIONS_ED_ALL_ED_FT
Dizziness    WHAT YOU NEED TO KNOW:    Dizziness is a feeling of being off balance or unsteady. Common causes of dizziness are an inner ear fluid imbalance or a lack of oxygen in your blood. Dizziness may be acute (lasts 3 days or less) or chronic (lasts longer than 3 days). You may have dizzy spells that last from seconds to a few hours.     DISCHARGE INSTRUCTIONS:    Return to the emergency department if:     You have a headache and a stiff neck.      You have shaking chills and a fever.       You vomit over and over with no relief.       Your vomit or bowel movements are red or black.       You have pain in your chest, back, or abdomen.       You have numbness, especially in your face, arms, or legs.       You have trouble moving your arms or legs.       You are confused.     Contact your healthcare provider if:     You have a fever.       Your symptoms do not get better with treatment.       You have questions or concerns about your condition or care.     Manage your symptoms:     Do not drive or operate heavy machinery when you are dizzy.       Get up slowly from sitting or lying down.       Drink plenty of liquids. Liquids help prevent dehydration. Ask how much liquid to drink each day and which liquids are best for you.    Follow up with your healthcare provider as directed: Write down your questions so you remember to ask them during your visits.        © Copyright [employer] 2019 All illustrations and images included in CareNotes are the copyrighted property of [employer] or [employer].      Nonspecific Chest Pain  Chest pain can be caused by many different conditions. There is always a chance that your pain could be related to something serious, such as a heart attack or a blood clot in your lungs. Chest pain can also be caused by conditions that are not life-threatening. If you have chest pain, it is very important to follow up with your health care provider.    What are the causes?  Causes of this condition include:    Heartburn.  Pneumonia or bronchitis.  Anxiety or stress.  Inflammation around your heart (pericarditis) or lung (pleuritis or pleurisy).  A blood clot in your lung.  A collapsed lung (pneumothorax). This can develop suddenly on its own (spontaneous pneumothorax) or from trauma to the chest.  Shingles infection (varicella-zoster virus).  Heart attack.  Damage to the bones, muscles, and cartilage that make up your chest wall. This can include:    Bruised bones due to injury.  Strained muscles or cartilage due to frequent or repeated coughing or overwork.  Fracture to one or more ribs.  Sore cartilage due to inflammation (costochondritis).      What increases the risk?  Risk factors for this condition may include:    Activities that increase your risk for trauma or injury to your chest.  Respiratory infections or conditions that cause frequent coughing.  Medical conditions or overeating that can cause heartburn.  Heart disease or family history of heart disease.  Conditions or health behaviors that increase your risk of developing a blood clot.  Having had chicken pox (varicella zoster).    What are the signs or symptoms?  Chest pain can feel like:    Burning or tingling on the surface of your chest or deep in your chest.  Crushing, pressure, aching, or squeezing pain.  Dull or sharp pain that is worse when you move, cough, or take a deep breath.  Pain that is also felt in your back, neck, shoulder, or arm, or pain that spreads to any of these areas.    Your chest pain may come and go, or it may stay constant.    How is this diagnosed?  Lab tests or other studies may be needed to find the cause of your pain. Your health care provider may have you take a test called an ECG (electrocardiogram). An ECG records your heartbeat patterns at the time the test is performed. You may also have other tests, such as:    Transthoracic echocardiogram (TTE). In this test, sound waves are used to create a picture of the heart structures and to look at how blood flows through your heart.  Transesophageal echocardiogram (AMELIA). This is a more advanced imaging test that takes images from inside your body. It allows your health care provider to see your heart in finer detail.  Cardiac monitoring. This allows your health care provider to monitor your heart rate and rhythm in real time.  Holter monitor. This is a portable device that records your heartbeat and can help to diagnose abnormal heartbeats. It allows your health care provider to track your heart activity for several days, if needed.  Stress tests. These can be done through exercise or by taking medicine that makes your heart beat more quickly.  Blood tests.  Other imaging tests.    How is this treated?  Treatment depends on what is causing your chest pain. Treatment may include:    Medicines. These may include:    Acid blockers for heartburn.  Anti-inflammatory medicine.  Pain medicine for inflammatory conditions.  Antibiotic medicine, if an infection is present.  Medicines to dissolve blood clots.  Medicines to treat coronary artery disease (CAD).    Supportive care for conditions that do not require medicines. This may include:    Resting.  Applying heat or cold packs to injured areas.  Limiting activities until pain decreases.      Follow these instructions at home:  Medicines     If you were prescribed an antibiotic, take it as told by your health care provider. Do not stop taking the antibiotic even if you start to feel better.  Take over-the-counter and prescription medicines only as told by your health care provider.  Lifestyle     Do not use any products that contain nicotine or tobacco, such as cigarettes and e-cigarettes. If you need help quitting, ask your health care provider.  Do not drink alcohol.  ImageMake lifestyle changes as directed by your health care provider. These may include:    Getting regular exercise. Ask your health care provider to suggest some activities that are safe for you.  Eating a heart-healthy diet. A registered dietitian can help you to learn healthy eating options.  Maintaining a healthy weight.  Managing diabetes, if necessary.  Reducing stress, such as with yoga or relaxation techniques.    General instructions     Avoid any activities that bring on chest pain.  If heartburn is the cause for your chest pain, raise (elevate) the head of your bed about 6 inches (15 cm) by putting blocks under the legs. Sleeping with more pillows does not effectively relieve heartburn because it only changes the position of your head.  Keep all follow-up visits as told by your health care provider. This is important. This includes any further testing if your chest pain does not go away.  Contact a health care provider if:  Your chest pain does not go away.  You have a rash with blisters on your chest.  You have a fever.  You have chills.  Get help right away if:  Your chest pain is worse.  You have a cough that gets worse, or you cough up blood.  You have severe pain in your abdomen.  You have severe weakness.  You faint.  You have sudden, unexplained chest discomfort.  You have sudden, unexplained discomfort in your arms, back, neck, or jaw.  You have shortness of breath at any time.  You suddenly start to sweat, or your skin gets clammy.  You feel nauseous or you vomit.  You suddenly feel light-headed or dizzy.  Your heart begins to beat quickly, or it feels like it is skipping beats.  These symptoms may represent a serious problem that is an emergency. Do not wait to see if the symptoms will go away. Get medical help right away. Call your local emergency services (911 in the [place] Do not drive yourself to the hospital.     This information is not intended to replace advice given to you by your health care provider. Make sure you discuss any questions you have with your health care provider.    HgA1c > 7  Follow a low carb low sugar diet. Continue to take all antidiabetic medications (metformin) as prescribed. Follow up with your Primary Care Doctor regularly for blood sugar/A1c checks. Be sure to see an eye doctor and foot doctor on an annual basis.    YOU HAVE BEEN PRESCRIBED MEDICATIONS FOR YOUR DIABETES AND ELEVATED CHOLESTEROL. [facility] APPOINTMENT INITIATED - THEY WILL CALL YOU TO SCHEDULE PROMPTLY.

## 2022-04-12 NOTE — ED CDU PROVIDER SUBSEQUENT DAY NOTE - HISTORY
35 yo male hx of DM (non-compliance to medication) placed in obs for dizziness and chest pain. as per patient, he started feeling dizziness since yesterday morning. symptoms are constant. position changes and ambulation worsen his symptoms. dizziness associates with nausea and chest pressure so wife brought him to ED for evaluation. denies fever/chill/HA/abd pain/vomiting/diarrhea and urinary sxs.

## 2022-04-12 NOTE — ED CDU PROVIDER DISPOSITION NOTE - CLINICAL COURSE
36-year-old male history of diabetes noncompliance with  meds presented for evaluation of dizziness and chest pain.  The patient was placed in observation unit for further evaluation treatment.  Patient's work-up included labs which showed no acute abnormalities except for significantly elevated glucose as well as elevated lipid panel and hemoglobin A1c.  EKG is normal sinus rhythm chest x-ray unremarkable CT head and CT angio were also unremarkable MRI of the brain unremarkable nuclear stress test within normal limits.  Patient on my evaluation has no symptoms spent considerable amount of time explaining need to be on medications for elevated cholesterol and sugar levels.  Patient will be restarted on Metformin and a statin.  Given rapid [facility] follow-up.

## 2022-04-12 NOTE — ED CDU PROVIDER DISPOSITION NOTE - NS ED ATTENDING STATEMENT MOD
This was a shared visit with the BLAINE. I reviewed and verified the documentation and independently performed the documented: Attending with

## 2022-04-12 NOTE — ED CDU PROVIDER DISPOSITION NOTE - PROVIDER TOKENS
PROVIDER:[TOKEN:[[id number]],FOLLOWUP:[Routine]],PROVIDER:[TOKEN:[[id number]],FOLLOWUP:[Routine]]

## 2022-04-12 NOTE — ED CDU PROVIDER SUBSEQUENT DAY NOTE - PROGRESS NOTE DETAILS
awaiting stress test result. dispo per result. Received sign out from ASHLEY Driscoll. Patient informed of extremely elevated cholesterol and A1C results. Patient was on medication in the past for his diabetes however discontinued on his own. He has not seen a physician in several months - states that he does not have insurance and used to go to our clinic. [facility] f/u appt initiated via MS Teams. Rxs for Metformin and Lipitor written for patient (preferred physical Rx). All additional labs and imaging studies reviewed with patient and partner and he has been provided a copy.

## 2022-04-13 PROBLEM — E11.9 TYPE 2 DIABETES MELLITUS WITHOUT COMPLICATIONS: Chronic | Status: ACTIVE | Noted: 2022-04-11

## 2022-05-02 ENCOUNTER — APPOINTMENT (OUTPATIENT)
Dept: INTERNAL MEDICINE | Facility: CLINIC | Age: 37
End: 2022-05-02
Payer: SELF-PAY

## 2022-05-02 ENCOUNTER — NON-APPOINTMENT (OUTPATIENT)
Age: 37
End: 2022-05-02

## 2022-05-02 ENCOUNTER — OUTPATIENT (OUTPATIENT)
Dept: OUTPATIENT SERVICES | Facility: HOSPITAL | Age: 37
LOS: 1 days | Discharge: HOME | End: 2022-05-02

## 2022-05-02 VITALS
SYSTOLIC BLOOD PRESSURE: 114 MMHG | DIASTOLIC BLOOD PRESSURE: 77 MMHG | BODY MASS INDEX: 29.81 KG/M2 | HEIGHT: 62 IN | TEMPERATURE: 97 F | WEIGHT: 162 LBS | HEART RATE: 81 BPM | OXYGEN SATURATION: 96 %

## 2022-05-02 DIAGNOSIS — Z00.00 ENCOUNTER FOR GENERAL ADULT MEDICAL EXAMINATION W/OUT ABNORMAL FINDINGS: ICD-10-CM

## 2022-05-02 DIAGNOSIS — E78.5 HYPERLIPIDEMIA, UNSPECIFIED: ICD-10-CM

## 2022-05-02 DIAGNOSIS — E78.1 PURE HYPERGLYCERIDEMIA: ICD-10-CM

## 2022-05-02 DIAGNOSIS — R42 DIZZINESS AND GIDDINESS: ICD-10-CM

## 2022-05-02 PROCEDURE — 99214 OFFICE O/P EST MOD 30 MIN: CPT | Mod: GC

## 2022-05-02 RX ORDER — ATORVASTATIN CALCIUM 80 MG/1
80 TABLET, FILM COATED ORAL
Qty: 30 | Refills: 5 | Status: ACTIVE | COMMUNITY
Start: 2022-05-02 | End: 1900-01-01

## 2022-05-02 RX ORDER — MECLIZINE HYDROCHLORIDE 12.5 MG/1
12.5 TABLET ORAL TWICE DAILY
Qty: 30 | Refills: 1 | Status: ACTIVE | COMMUNITY
Start: 2022-05-02 | End: 1900-01-01

## 2022-05-02 RX ORDER — GLIPIZIDE 5 MG/1
5 TABLET ORAL DAILY
Qty: 30 | Refills: 5 | Status: ACTIVE | COMMUNITY
Start: 2021-07-01 | End: 1900-01-01

## 2022-05-02 RX ORDER — ROSUVASTATIN CALCIUM 20 MG/1
20 TABLET, FILM COATED ORAL DAILY
Qty: 30 | Refills: 5 | Status: DISCONTINUED | COMMUNITY
Start: 2021-07-01 | End: 2022-05-02

## 2022-05-02 RX ORDER — FENOFIBRATE 145 MG/1
145 TABLET, COATED ORAL DAILY
Qty: 30 | Refills: 5 | Status: ACTIVE | COMMUNITY
Start: 2022-05-02 | End: 1900-01-01

## 2022-05-02 NOTE — ASSESSMENT
[FreeTextEntry1] : # DM, A1C in 4/2022 11.9\par - Restart metformin\par - STart glipizide\par - Obtain A1C\par - Education provided\par - Refer to endocrine\par \par # HLD\par # Hyperglyceridemia\par - Lipitor 80, start fenofibrate 145\par \par # Vertigo\par - BPPV?\par - Start meclizine PRN\par - Refer to neuro

## 2022-05-02 NOTE — HISTORY OF PRESENT ILLNESS
[FreeTextEntry1] : f/u [de-identified] :  [id number]\par \par 37 y/o M PMH DM here for f/u. He was recently discharged from hospital after being admitted for vertigo, described as "room spinning". W/u, including CT angiogram, MRI and echo was negative. Vertigo is still present. Started 1 month ago, worse when moving, no tinniitis or hearing loss.\par He also complains about b/l low back pain, started 2 weeks ago, standing or moving makes it worse, resting makes it better. He has tried tylenol but has not worked.\par For his DM and HLD he has not been taking anything until his hospital visit when they prescribed him lipitor and metformin.

## 2022-05-02 NOTE — PHYSICAL EXAM
[No Acute Distress] : no acute distress [Well Nourished] : well nourished [Well Developed] : well developed [Normal Sclera/Conjunctiva] : normal sclera/conjunctiva [PERRL] : pupils equal round and reactive to light [Normal Outer Ear/Nose] : the outer ears and nose were normal in appearance [Normal Oropharynx] : the oropharynx was normal [No Respiratory Distress] : no respiratory distress  [No Accessory Muscle Use] : no accessory muscle use [Clear to Auscultation] : lungs were clear to auscultation bilaterally [Normal Rate] : normal rate  [Regular Rhythm] : with a regular rhythm [Normal S1, S2] : normal S1 and S2 [No Murmur] : no murmur heard [No Abdominal Bruit] : a ~M bruit was not heard ~T in the abdomen [No Varicosities] : no varicosities [No Edema] : there was no peripheral edema [Soft] : abdomen soft [Non Tender] : non-tender [Normal Bowel Sounds] : normal bowel sounds [No CVA Tenderness] : no CVA  tenderness [No Spinal Tenderness] : no spinal tenderness [No Joint Swelling] : no joint swelling [Grossly Normal Strength/Tone] : grossly normal strength/tone [Coordination Grossly Intact] : coordination grossly intact [No Focal Deficits] : no focal deficits [Normal Gait] : normal gait [Normal Affect] : the affect was normal [Alert and Oriented x3] : oriented to person, place, and time [Normal Insight/Judgement] : insight and judgment were intact

## 2022-05-04 DIAGNOSIS — E78.5 HYPERLIPIDEMIA, UNSPECIFIED: ICD-10-CM

## 2022-05-04 DIAGNOSIS — R42 DIZZINESS AND GIDDINESS: ICD-10-CM

## 2022-05-04 DIAGNOSIS — E78.1 PURE HYPERGLYCERIDEMIA: ICD-10-CM

## 2022-05-04 DIAGNOSIS — E11.9 TYPE 2 DIABETES MELLITUS WITHOUT COMPLICATIONS: ICD-10-CM

## 2022-05-04 DIAGNOSIS — Z00.00 ENCOUNTER FOR GENERAL ADULT MEDICAL EXAMINATION WITHOUT ABNORMAL FINDINGS: ICD-10-CM

## 2022-05-25 ENCOUNTER — APPOINTMENT (OUTPATIENT)
Dept: UROLOGY | Facility: CLINIC | Age: 37
End: 2022-05-25

## 2022-10-05 ENCOUNTER — APPOINTMENT (OUTPATIENT)
Dept: INTERNAL MEDICINE | Facility: CLINIC | Age: 37
End: 2022-10-05

## 2022-12-27 NOTE — DATA REVIEWED
[No studies available for review at this time.] : No studies available for review at this time. Do not drive or operate machinery/Showering allowed/Stairs allowed/Walking - Indoors allowed/No heavy lifting/straining/Walking - Outdoors allowed/Follow Instructions Provided by your Surgical Team

## 2023-04-14 ENCOUNTER — EMERGENCY (EMERGENCY)
Facility: HOSPITAL | Age: 38
LOS: 0 days | Discharge: HOME | End: 2023-04-15
Attending: EMERGENCY MEDICINE
Payer: MEDICAID

## 2023-04-14 VITALS
TEMPERATURE: 98 F | SYSTOLIC BLOOD PRESSURE: 133 MMHG | HEART RATE: 69 BPM | WEIGHT: 154.98 LBS | DIASTOLIC BLOOD PRESSURE: 89 MMHG | OXYGEN SATURATION: 100 %

## 2023-04-14 DIAGNOSIS — N47.1 PHIMOSIS: ICD-10-CM

## 2023-04-14 DIAGNOSIS — R30.0 DYSURIA: ICD-10-CM

## 2023-04-14 DIAGNOSIS — R21 RASH AND OTHER NONSPECIFIC SKIN ERUPTION: ICD-10-CM

## 2023-04-14 DIAGNOSIS — E78.5 HYPERLIPIDEMIA, UNSPECIFIED: ICD-10-CM

## 2023-04-14 DIAGNOSIS — N48.1 BALANITIS: ICD-10-CM

## 2023-04-14 DIAGNOSIS — E11.9 TYPE 2 DIABETES MELLITUS WITHOUT COMPLICATIONS: ICD-10-CM

## 2023-04-14 DIAGNOSIS — Z87.2 PERSONAL HISTORY OF DISEASES OF THE SKIN AND SUBCUTANEOUS TISSUE: ICD-10-CM

## 2023-04-14 DIAGNOSIS — Z79.84 LONG TERM (CURRENT) USE OF ORAL HYPOGLYCEMIC DRUGS: ICD-10-CM

## 2023-04-14 PROCEDURE — 99283 EMERGENCY DEPT VISIT LOW MDM: CPT

## 2023-04-15 NOTE — ED PROVIDER NOTE - CARE PROVIDER_API CALL
Rebekah Dupree)  Urology  [address] [place] [address] [address] [place] [address]  Phone: [phone]  Fax: [phone]  Follow Up Time: 1-3 Days

## 2023-04-15 NOTE — ED PROVIDER NOTE - PHYSICAL EXAMINATION
Constitutional: Well developed, well nourished. NAD  Head: Normocephalic, atraumatic.  Eyes: PERRL, EOMI.  ENT: No nasal discharge. Mucous membranes dry.  Neck: Supple. Painless ROM.  Cardiovascular: Normal S1, S2. Regular rate and rhythm.    Pulmonary:  Lungs clear to auscultation bilaterally.    Abdominal: Soft. Nondistended. No rebound, guarding, rigidity.  Gu: Uncircumcised penis with fissuring of foreskin on retraction with pain; mild glans penis redness; no testicular pain; no abdominal pain  Extremities. Pelvis stable. No lower extremity edema, symmetric calves.  Skin: No rashes, cyanosis.  Neuro: AAOx3. No focal neurological deficits.  Psych: Normal mood. Normal affect.

## 2023-04-15 NOTE — ED PROVIDER NOTE - CLINICAL SUMMARY MEDICAL DECISION MAKING FREE TEXT BOX
Patient evaluated for rash to penis consistent with likely balanitis, advised medication use which was prescribed and close follow-up with urology for reevaluation and planned circumcision.  Also advised strict glucose control and patient aware.  Strict return precautions advised and patient verbalized understanding.

## 2023-04-15 NOTE — ED PROVIDER NOTE - PATIENT PORTAL LINK FT
You can access the FollowMyHealth Patient Portal offered by [facility] by registering at the following website: [email or web address]. By joining [employer]’s FollowMyHealth portal, you will also be able to view your health information using other applications (apps) compatible with our system.

## 2023-04-15 NOTE — ED PROVIDER NOTE - ATTENDING APP SHARED VISIT CONTRIBUTION OF CARE
37-year-old male with PMH DM, HLD, eczema presents for evaluation of rash and discomfort to penis. Patient reports he has had fissuring and discomfort chronically but over the past few days it is gotten worse.  Has previously been prescribed hydrocortisone cream and has had some relief.  Is pending urology evaluation for circumcision the presents to ED due to new dysuria.  No hematuria, fevers, chills, flank pain, nausea, vomiting or diarrhea.  Denies any penile discharge or scrotal/testicular pain.

## 2023-04-15 NOTE — ED PROVIDER NOTE - NSFOLLOWUPINSTRUCTIONS_ED_ALL_ED_FT
Balanitis    WHAT YOU NEED TO KNOW:    Balanitis is inflammation of the glans (head) of the penis. It is usually caused by bacteria or a fungus.    DISCHARGE INSTRUCTIONS:    Medicines:     Medicines help fight or prevent an infection caused by bacteria or a fungus. This medicine may be given as a pill or a cream.      Take your medicine as directed. Contact your healthcare provider if you think your medicine is not helping or if you have side effects. Tell him of her if you are allergic to any medicine. Keep a list of the medicines, vitamins, and herbs you take. Include the amounts, and when and why you take them. Bring the list or the pill bottles to follow-up visits. Carry your medicine list with you in case of an emergency.    Clean your penis carefully: Gently push back the foreskin 2 to 3 times a day and wash the infected area well with soap and water. If you have a catheter, ask how to keep it clean.    Take a sitz bath: Fill a bathtub with 4 to 6 inches of warm water. You may also use a sitz bath pan that fits over a toilet. Sit in the sitz bath for 20 minutes. Do this 2 to 3 times a day, or as directed. The warm water can help decrease pain and swelling.     Maintain a healthy weight: Ask your healthcare provider how much you should weigh. Ask him to help you create a weight loss plan if you are overweight.     Follow up with your healthcare provider in 1 to 2 weeks: Write down your questions so you remember to ask them during your visits.    Contact your healthcare provider if:     You have a fever.      You have pain when you urinate.      You have questions or concerns about your condition or care.    Return to the emergency department if:     You are not able to urinate.              © Copyright [employer] 2019 All illustrations and images included in CareNotes are the copyrighted property of [employer] or [employer].

## 2023-04-15 NOTE — ED PROVIDER NOTE - OBJECTIVE STATEMENT
37 yold male to ED Pmhx Dm, Hld, eczema c/o penile foreskin fissuring and pain chronically but worse x 2-3 days; pt seen previously given hydrocortisone cream with mild relief; pt also pending eval by urology for circumcision; pt with pain at penile tip with urination; pt denies fever, chills, n/v/back or abdominal pain;

## 2023-04-20 ENCOUNTER — OUTPATIENT (OUTPATIENT)
Dept: OUTPATIENT SERVICES | Facility: HOSPITAL | Age: 38
LOS: 1 days | End: 2023-04-20
Payer: COMMERCIAL

## 2023-04-20 ENCOUNTER — APPOINTMENT (OUTPATIENT)
Dept: UROLOGY | Facility: CLINIC | Age: 38
End: 2023-04-20
Payer: COMMERCIAL

## 2023-04-20 ENCOUNTER — NON-APPOINTMENT (OUTPATIENT)
Age: 38
End: 2023-04-20

## 2023-04-20 VITALS
BODY MASS INDEX: 30 KG/M2 | SYSTOLIC BLOOD PRESSURE: 119 MMHG | WEIGHT: 163 LBS | OXYGEN SATURATION: 99 % | DIASTOLIC BLOOD PRESSURE: 76 MMHG | TEMPERATURE: 98.1 F | HEART RATE: 69 BPM | HEIGHT: 62 IN

## 2023-04-20 DIAGNOSIS — Z00.00 ENCOUNTER FOR GENERAL ADULT MEDICAL EXAMINATION WITHOUT ABNORMAL FINDINGS: ICD-10-CM

## 2023-04-20 DIAGNOSIS — E11.9 TYPE 2 DIABETES MELLITUS W/OUT COMPLICATIONS: ICD-10-CM

## 2023-04-20 PROCEDURE — 83036 HEMOGLOBIN GLYCOSYLATED A1C: CPT

## 2023-04-20 PROCEDURE — 99204 OFFICE O/P NEW MOD 45 MIN: CPT

## 2023-04-20 NOTE — ASSESSMENT
[FreeTextEntry1] : This is a 37 year male who has history of DM and with phimosis and foreskin fissures \par no other medical problems\par \par PE -- tight painful foreskin fissures\par \par \par June 2021\par A1c - 12.4

## 2023-04-21 LAB
ESTIMATED AVERAGE GLUCOSE: 266 MG/DL
HBA1C MFR BLD HPLC: 10.9 %

## 2023-04-24 DIAGNOSIS — E11.9 TYPE 2 DIABETES MELLITUS WITHOUT COMPLICATIONS: ICD-10-CM

## 2023-04-24 DIAGNOSIS — N47.1 PHIMOSIS: ICD-10-CM

## 2023-04-24 DIAGNOSIS — N48.89 OTHER SPECIFIED DISORDERS OF PENIS: ICD-10-CM

## 2023-04-25 ENCOUNTER — RESULT REVIEW (OUTPATIENT)
Age: 38
End: 2023-04-25

## 2023-04-25 ENCOUNTER — OUTPATIENT (OUTPATIENT)
Dept: INPATIENT UNIT | Facility: HOSPITAL | Age: 38
LOS: 1 days | Discharge: HOME | End: 2023-04-25
Payer: COMMERCIAL

## 2023-04-25 ENCOUNTER — APPOINTMENT (OUTPATIENT)
Dept: UROLOGY | Facility: HOSPITAL | Age: 38
End: 2023-04-25

## 2023-04-25 ENCOUNTER — TRANSCRIPTION ENCOUNTER (OUTPATIENT)
Age: 38
End: 2023-04-25

## 2023-04-25 ENCOUNTER — NON-APPOINTMENT (OUTPATIENT)
Age: 38
End: 2023-04-25

## 2023-04-25 VITALS — RESPIRATION RATE: 17 BRPM | SYSTOLIC BLOOD PRESSURE: 107 MMHG | HEART RATE: 73 BPM | DIASTOLIC BLOOD PRESSURE: 64 MMHG

## 2023-04-25 VITALS
RESPIRATION RATE: 17 BRPM | WEIGHT: 160.06 LBS | HEART RATE: 61 BPM | HEIGHT: 61 IN | DIASTOLIC BLOOD PRESSURE: 89 MMHG | OXYGEN SATURATION: 99 % | TEMPERATURE: 96 F | SYSTOLIC BLOOD PRESSURE: 120 MMHG

## 2023-04-25 DIAGNOSIS — N47.1 PHIMOSIS: ICD-10-CM

## 2023-04-25 LAB — GLUCOSE BLDC GLUCOMTR-MCNC: 150 MG/DL — HIGH (ref 70–99)

## 2023-04-25 PROCEDURE — 88304 TISSUE EXAM BY PATHOLOGIST: CPT | Mod: 26

## 2023-04-25 PROCEDURE — 54161 CIRCUM 28 DAYS OR OLDER: CPT

## 2023-04-25 PROCEDURE — 88304 TISSUE EXAM BY PATHOLOGIST: CPT

## 2023-04-25 PROCEDURE — 82962 GLUCOSE BLOOD TEST: CPT

## 2023-04-25 RX ORDER — SODIUM CHLORIDE 9 MG/ML
1000 INJECTION, SOLUTION INTRAVENOUS
Refills: 0 | Status: DISCONTINUED | OUTPATIENT
Start: 2023-04-25 | End: 2023-04-25

## 2023-04-25 RX ORDER — HYDROMORPHONE HYDROCHLORIDE 2 MG/ML
0.5 INJECTION INTRAMUSCULAR; INTRAVENOUS; SUBCUTANEOUS
Refills: 0 | Status: DISCONTINUED | OUTPATIENT
Start: 2023-04-25 | End: 2023-04-25

## 2023-04-25 RX ORDER — ONDANSETRON 8 MG/1
4 TABLET, FILM COATED ORAL ONCE
Refills: 0 | Status: DISCONTINUED | OUTPATIENT
Start: 2023-04-25 | End: 2023-04-25

## 2023-04-25 NOTE — ASU DISCHARGE PLAN (ADULT/PEDIATRIC) - PLEASE INDICATE TEMPERATURE IN FAHRENHEIT OR CELSIUS
Patient resting comfortably on stretcher in no acute distress. Respirations easy and unlabored. He offers no complaints. Assessment unchanged. Continuous cardiac monitor, blood pressure, and oxygen saturation monitoring remains applied. Vital signs stable. Patient denies need for assistance, call bell in reach, will continue to monitor patient. 101F

## 2023-04-25 NOTE — ASU DISCHARGE PLAN (ADULT/PEDIATRIC) - CARE PROVIDER_API CALL
Kuldip Chan)  Urology  [address] [place] [address], [address]  [place] [address]  Phone: [phone]  Fax: [phone]  Follow Up Time:

## 2023-04-25 NOTE — ASU DISCHARGE PLAN (ADULT/PEDIATRIC) - NS MD DC FALL RISK RISK
For information on Fall & Injury Prevention, visit: [email or web address] OR  [email or web address] OR  https://www.cdc.gov/steadi/patient.html

## 2023-04-25 NOTE — ASU PATIENT PROFILE, ADULT - FALL HARM RISK - UNIVERSAL INTERVENTIONS
Bed in lowest position, wheels locked, appropriate side rails in place/Call bell, personal items and telephone in reach/Instruct patient to call for assistance before getting out of bed or chair/Non-slip footwear when patient is out of bed/[place] to call system/Physically safe environment - no spills, clutter or unnecessary equipment/Purposeful Proactive Rounding/Room/bathroom lighting operational, light cord in reach

## 2023-04-26 ENCOUNTER — NON-APPOINTMENT (OUTPATIENT)
Age: 38
End: 2023-04-26

## 2023-04-26 PROBLEM — E78.00 PURE HYPERCHOLESTEROLEMIA, UNSPECIFIED: Chronic | Status: ACTIVE | Noted: 2023-04-25

## 2023-04-27 DIAGNOSIS — Z79.84 LONG TERM (CURRENT) USE OF ORAL HYPOGLYCEMIC DRUGS: ICD-10-CM

## 2023-04-27 DIAGNOSIS — N47.1 PHIMOSIS: ICD-10-CM

## 2023-04-27 DIAGNOSIS — E11.9 TYPE 2 DIABETES MELLITUS WITHOUT COMPLICATIONS: ICD-10-CM

## 2023-04-27 LAB — SURGICAL PATHOLOGY STUDY: SIGNIFICANT CHANGE UP

## 2023-05-02 ENCOUNTER — NON-APPOINTMENT (OUTPATIENT)
Age: 38
End: 2023-05-02

## 2023-05-11 ENCOUNTER — APPOINTMENT (OUTPATIENT)
Dept: UROLOGY | Facility: CLINIC | Age: 38
End: 2023-05-11
Payer: COMMERCIAL

## 2023-05-11 ENCOUNTER — OUTPATIENT (OUTPATIENT)
Dept: OUTPATIENT SERVICES | Facility: HOSPITAL | Age: 38
LOS: 1 days | End: 2023-05-11
Payer: COMMERCIAL

## 2023-05-11 ENCOUNTER — NON-APPOINTMENT (OUTPATIENT)
Age: 38
End: 2023-05-11

## 2023-05-11 VITALS
BODY MASS INDEX: 30.55 KG/M2 | HEIGHT: 62 IN | SYSTOLIC BLOOD PRESSURE: 127 MMHG | WEIGHT: 166 LBS | OXYGEN SATURATION: 98 % | DIASTOLIC BLOOD PRESSURE: 73 MMHG | HEART RATE: 63 BPM | TEMPERATURE: 97.7 F

## 2023-05-11 DIAGNOSIS — N48.89 OTHER SPECIFIED DISORDERS OF PENIS: ICD-10-CM

## 2023-05-11 DIAGNOSIS — Z00.00 ENCOUNTER FOR GENERAL ADULT MEDICAL EXAMINATION WITHOUT ABNORMAL FINDINGS: ICD-10-CM

## 2023-05-11 DIAGNOSIS — N47.1 PHIMOSIS: ICD-10-CM

## 2023-05-11 PROCEDURE — 99213 OFFICE O/P EST LOW 20 MIN: CPT

## 2023-05-11 NOTE — ASSESSMENT
[FreeTextEntry1] : This is a 37 year male who has history of DM and with phimosis and foreskin fissures \par no other medical problems\par \par PE -- tight painful foreskin fissures\par \par June 2021\par A1c - 12.4. \par \par  underwent surgery -- circumcision on April 2023\par \par he is recovering well with no signs of infection\par pain is controlled

## 2023-05-16 ENCOUNTER — APPOINTMENT (OUTPATIENT)
Dept: UROLOGY | Facility: CLINIC | Age: 38
End: 2023-05-16

## 2023-05-18 DIAGNOSIS — N47.1 PHIMOSIS: ICD-10-CM

## 2023-05-18 DIAGNOSIS — N48.89 OTHER SPECIFIED DISORDERS OF PENIS: ICD-10-CM

## 2023-05-30 ENCOUNTER — NON-APPOINTMENT (OUTPATIENT)
Age: 38
End: 2023-05-30

## 2023-06-02 ENCOUNTER — LABORATORY RESULT (OUTPATIENT)
Age: 38
End: 2023-06-02

## 2023-06-02 ENCOUNTER — APPOINTMENT (OUTPATIENT)
Dept: INTERNAL MEDICINE | Facility: CLINIC | Age: 38
End: 2023-06-02

## 2023-06-02 ENCOUNTER — OUTPATIENT (OUTPATIENT)
Dept: OUTPATIENT SERVICES | Facility: HOSPITAL | Age: 38
LOS: 1 days | End: 2023-06-02
Payer: COMMERCIAL

## 2023-06-02 VITALS
DIASTOLIC BLOOD PRESSURE: 77 MMHG | BODY MASS INDEX: 30.73 KG/M2 | OXYGEN SATURATION: 98 % | SYSTOLIC BLOOD PRESSURE: 115 MMHG | TEMPERATURE: 97.6 F | HEIGHT: 62 IN | HEART RATE: 63 BPM | WEIGHT: 167 LBS

## 2023-06-02 DIAGNOSIS — K14.0 GLOSSITIS: ICD-10-CM

## 2023-06-02 DIAGNOSIS — Z00.00 ENCOUNTER FOR GENERAL ADULT MEDICAL EXAMINATION WITHOUT ABNORMAL FINDINGS: ICD-10-CM

## 2023-06-02 DIAGNOSIS — L21.9 SEBORRHEIC DERMATITIS, UNSPECIFIED: ICD-10-CM

## 2023-06-02 PROCEDURE — 83721 ASSAY OF BLOOD LIPOPROTEIN: CPT

## 2023-06-02 PROCEDURE — 99214 OFFICE O/P EST MOD 30 MIN: CPT

## 2023-06-02 PROCEDURE — 83036 HEMOGLOBIN GLYCOSYLATED A1C: CPT

## 2023-06-02 PROCEDURE — 80061 LIPID PANEL: CPT

## 2023-06-02 PROCEDURE — 80053 COMPREHEN METABOLIC PANEL: CPT

## 2023-06-02 PROCEDURE — 85027 COMPLETE CBC AUTOMATED: CPT

## 2023-06-02 RX ORDER — METFORMIN HYDROCHLORIDE 850 MG/1
850 TABLET, COATED ORAL TWICE DAILY
Qty: 60 | Refills: 5 | Status: ACTIVE | COMMUNITY
Start: 2021-07-01 | End: 1900-01-01

## 2023-06-02 NOTE — REVIEW OF SYSTEMS
[Itching] : itching [Fever] : no fever [Chills] : no chills [Fatigue] : no fatigue [Chest Pain] : no chest pain [Orthopena] : no orthopnea [Shortness Of Breath] : no shortness of breath [Abdominal Pain] : no abdominal pain [Nausea] : no nausea [Vomiting] : no vomiting [Dysuria] : no dysuria [Back Pain] : no back pain [Headache] : no headache [Dizziness] : no dizziness [FreeTextEntry4] : Dry tongue and diminished taste

## 2023-06-02 NOTE — HISTORY OF PRESENT ILLNESS
[Family Member] : family member [FreeTextEntry1] : Presents with dry and cracked tongue with complaint of tingling sensation with eating and diminished taste [de-identified] : 38 yo M w/PMH DM, vertigo, and oral thrush recently treated with ketoconazole here for follow-up visit\par \par Complains of diminished sensation to taste and a dry, cracked tongue. Never noticed white spots on the tonguecream \par Denies any other symptoms at this time, did have recent circumcision done w/Urology team here at [facility] without complications. \par \par \par

## 2023-06-02 NOTE — ASSESSMENT
[FreeTextEntry1] : #Diabetes mellitus\par - C/w metformin & glipizide, Metformin changed to 850mg BID this appointment\par - Diet and exercise counseling to eat healthier and exercise more to bring down A1C\par -A1C noted to be 10.9 in April, recheck routine labs this appointment\par \par #Dyslipidemia\par - C/w atorvastatin 80mg\par - TG's noted > 2000 on bloodwork  last June, c/w fenofibrate for now\par \par #Chronic taste changes and glossitis\par - ID referral for suspected post-COVID changes as this has been bothering the patient since recovering from COVID-19 infection > 1 year ago\par \par #Vertigo\par -C/w meclizine\par \par #Dry itchy skin on scalp, refractory to ketoconazole shampoo\par - Dermatology referral provided\par \par

## 2023-06-02 NOTE — PHYSICAL EXAM
[No Acute Distress] : no acute distress [Well Nourished] : well nourished [Well Developed] : well developed [Normal Sclera/Conjunctiva] : normal sclera/conjunctiva [PERRL] : pupils equal round and reactive to light [EOMI] : extraocular movements intact [Normal Outer Ear/Nose] : the outer ears and nose were normal in appearance [No JVD] : no jugular venous distention [No Respiratory Distress] : no respiratory distress  [Soft] : abdomen soft [Non Tender] : non-tender [Non-distended] : non-distended [No Focal Deficits] : no focal deficits [Normal Affect] : the affect was normal [Normal Insight/Judgement] : insight and judgment were intact [de-identified] : Cracked and dry tongue, no oral thrush

## 2023-06-05 DIAGNOSIS — E11.9 TYPE 2 DIABETES MELLITUS WITHOUT COMPLICATIONS: ICD-10-CM

## 2023-06-05 DIAGNOSIS — L21.9 SEBORRHEIC DERMATITIS, UNSPECIFIED: ICD-10-CM

## 2023-06-05 DIAGNOSIS — K14.0 GLOSSITIS: ICD-10-CM

## 2023-06-05 DIAGNOSIS — E78.5 HYPERLIPIDEMIA, UNSPECIFIED: ICD-10-CM

## 2023-06-05 LAB
ALBUMIN SERPL ELPH-MCNC: 4.3 G/DL
ALP BLD-CCNC: 103 U/L
ALT SERPL-CCNC: 38 U/L
ANION GAP SERPL CALC-SCNC: 11 MMOL/L
AST SERPL-CCNC: 18 U/L
BILIRUB SERPL-MCNC: 0.3 MG/DL
BUN SERPL-MCNC: 17 MG/DL
CALCIUM SERPL-MCNC: 9.1 MG/DL
CHLORIDE SERPL-SCNC: 99 MMOL/L
CHOLEST SERPL-MCNC: 256 MG/DL
CO2 SERPL-SCNC: 25 MMOL/L
CREAT SERPL-MCNC: 0.5 MG/DL
EGFR: 136 ML/MIN/1.73M2
ESTIMATED AVERAGE GLUCOSE: 298 MG/DL
GLUCOSE SERPL-MCNC: 323 MG/DL
HBA1C MFR BLD HPLC: 12 %
HCT VFR BLD CALC: 46.3 %
HDLC SERPL-MCNC: 23 MG/DL
HGB BLD-MCNC: 15.9 G/DL
LDLC SERPL CALC-MCNC: ABNORMAL
MCHC RBC-ENTMCNC: 30.2 PG
MCHC RBC-ENTMCNC: 34.3 G/DL
MCV RBC AUTO: 87.9 FL
NONHDLC SERPL-MCNC: 233 MG/DL
PLATELET # BLD AUTO: 227 K/UL
PMV BLD: 10.4 FL
POTASSIUM SERPL-SCNC: 4.3 MMOL/L
PROT SERPL-MCNC: 6.6 G/DL
RBC # BLD: 5.27 M/UL
RBC # FLD: 12.9 %
SODIUM SERPL-SCNC: 135 MMOL/L
TRIGL SERPL-MCNC: 1434 MG/DL
WBC # FLD AUTO: 6.37 K/UL

## 2023-07-07 ENCOUNTER — APPOINTMENT (OUTPATIENT)
Dept: INTERNAL MEDICINE | Facility: CLINIC | Age: 38
End: 2023-07-07

## 2024-03-13 ENCOUNTER — EMERGENCY (EMERGENCY)
Facility: HOSPITAL | Age: 39
LOS: 0 days | Discharge: HOME | End: 2024-03-13
Attending: EMERGENCY MEDICINE
Payer: MEDICAID

## 2024-03-13 VITALS
RESPIRATION RATE: 18 BRPM | TEMPERATURE: 98 F | OXYGEN SATURATION: 96 % | HEART RATE: 120 BPM | HEIGHT: 62 IN | DIASTOLIC BLOOD PRESSURE: 93 MMHG | SYSTOLIC BLOOD PRESSURE: 139 MMHG | WEIGHT: 160.06 LBS

## 2024-03-13 VITALS — HEART RATE: 89 BPM

## 2024-03-13 DIAGNOSIS — E78.5 HYPERLIPIDEMIA, UNSPECIFIED: ICD-10-CM

## 2024-03-13 DIAGNOSIS — E11.9 TYPE 2 DIABETES MELLITUS WITHOUT COMPLICATIONS: ICD-10-CM
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
## 2024-03-13 DIAGNOSIS — Z23 ENCOUNTER FOR IMMUNIZATION: ICD-10-CM

## 2024-03-13 DIAGNOSIS — S01.511A LACERATION WITHOUT FOREIGN BODY OF LIP, INITIAL ENCOUNTER: ICD-10-CM
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
## 2024-03-13 PROCEDURE — 70486 CT MAXILLOFACIAL W/O DYE: CPT | Mod: MC

## 2024-03-13 PROCEDURE — 99284 EMERGENCY DEPT VISIT MOD MDM: CPT

## 2024-03-13 PROCEDURE — 90715 TDAP VACCINE 7 YRS/> IM: CPT

## 2024-03-13 PROCEDURE — 90471 IMMUNIZATION ADMIN: CPT

## 2024-03-13 PROCEDURE — 96372 THER/PROPH/DIAG INJ SC/IM: CPT

## 2024-03-13 PROCEDURE — 99284 EMERGENCY DEPT VISIT MOD MDM: CPT | Mod: 25

## 2024-03-13 PROCEDURE — 70486 CT MAXILLOFACIAL W/O DYE: CPT | Mod: 26,MC

## 2024-03-13 RX ORDER — KETOROLAC TROMETHAMINE 30 MG/ML
30 SYRINGE (ML) INJECTION ONCE
Refills: 0 | Status: DISCONTINUED | OUTPATIENT
Start: 2024-03-13 | End: 2024-03-13

## 2024-03-13 RX ORDER — IBUPROFEN 200 MG
1 TABLET ORAL
Qty: 20 | Refills: 0
Start: 2024-03-13 | End: 2024-03-17

## 2024-03-13 RX ORDER — TETANUS TOXOID, REDUCED DIPHTHERIA TOXOID AND ACELLULAR PERTUSSIS VACCINE, ADSORBED 5; 2.5; 8; 8; 2.5 [IU]/.5ML; [IU]/.5ML; UG/.5ML; UG/.5ML; UG/.5ML
0.5 SUSPENSION INTRAMUSCULAR ONCE
Refills: 0 | Status: COMPLETED | OUTPATIENT
Start: 2024-03-13 | End: 2024-03-13

## 2024-03-13 RX ADMIN — Medication 1 TABLET(S): at 05:39

## 2024-03-13 RX ADMIN — TETANUS TOXOID, REDUCED DIPHTHERIA TOXOID AND ACELLULAR PERTUSSIS VACCINE, ADSORBED 0.5 MILLILITER(S): 5; 2.5; 8; 8; 2.5 SUSPENSION INTRAMUSCULAR at 05:39

## 2024-03-13 RX ADMIN — Medication 30 MILLIGRAM(S): at 05:39

## 2024-03-13 NOTE — ED PROVIDER NOTE - NSFOLLOWUPINSTRUCTIONS_ED_ALL_ED_FT
Vaya a la clínica dental en [address] [place] [address] (mismo edificio jacob separado del departamento de emergencias) en 1 o 2 días.    Le enviaron antibióticos a palacio farmacia; tómelos según lo recetado.    Navarino 600 mg de ibuprofeno cada 6 a 8 horas según sea necesario para el dolor.      Dolor dental  Dental Pain    El dolor dental es a menudo un signo de que sucede algo en los dientes o las encías. También es algo que puede ocurrir después de un tratamiento dental. Si tiene dolor dental, es importante que se ponga en contacto con palacio dentista, especialmente si no se ha determinado la causa del dolor. La intensidad del dolor dental puede variar y carol causas pueden ser muchas, entre ellas, las siguientes:  Caries. Las caries son causadas por bacterias que producen ácidos que irritan el nervio del diente, volviéndolo sensible al aire y a las temperaturas altas o bajas. Terril con el tiempo provoca molestias o dolor.  Infección o absceso. Thuy vez que las bacterias llegan a la parte interna del diente (pulpa), puede producirse thuy infección bacteriana (absceso dental). El pus suele acumularse en el extremo de la raíz de un diente.  Lesiones.  Thuy grieta en el diente.  Retracción de las encías que expone la raíz y posiblemente los nervios de un diente.  Enfermedad en las encías (periodontal).  Apretar o rechinar los dientes de forma anormal.  Cuidado deficiente o inadecuado en el hogar.  Thuy razón desconocida (idiopática).  El dolor puede ser leve o intenso. Puede ocurrir cuando usted:  [place]a.  Está expuesto a temperaturas calientes o frías.  Come alimentos o aj bebidas con azúcar, por ejemplo, gaseosas o caramelos.  El dolor puede ser brian, o puede aparecer y desaparecer sin ninguna causa.    Siga estas instrucciones en palacio casa:  Las siguientes medidas pueden servir para aliviar cualquier molestia que esté sintiendo antes o después de recibir atención dental.    Medicamentos    Navarino los medicamentos de venta melonie y los recetados solamente gene se lo haya indicado el dentista.  Si le recetaron un antibiótico, tómelo gene se lo haya indicado el dentista. No deje de aj los antibióticos aunque comience a sentirse mejor.  Comida y bebida    Evite los alimentos o las bebidas que le causen dolor, gene los siguientes:  Alimentos o bebidas muy calientes o muy fríos.  Alimentos o bebidas dulces o con azúcar.  Control del dolor y la hinchazón      El hielo a veces se puede usar para reducir el dolor y la hinchazón, especialmente si el dolor aparece después de un tratamiento dental.  Si se lo indican, aplique hielo sobre la tara dolorida de la russell. Para hacer esto:  Ponga el hielo en thuy bolsa plástica.  Coloque thuy toalla entre la piel y la bolsa.  Aplique el hielo anai 20 minutos, 2 o 3 veces por día.  Retire el hielo si la piel se pone de color fields brillante. Terril es muy importante. Si no puede sentir dolor, calor o frío, tiene un mayor riesgo de que se dañe la tara.  Cepillarse los dientes    Para mantener la boca y las encías sanas, cepíllese los dientes dos veces por día con thuy pasta dental con flúor.  Use thuy pasta dental para dientes sensibles gene se lo haya indicado el dentista, especialmente si la raíz está expuesta.  Cepíllese siempre los dientes con un cepillo de cerdas suaves. Terril ayudará a evitar la irritación de las encías.  Instrucciones generales    Use hilo dental al menos thuy vez al día.  No se aplique calor en la parte externa de la russell.  Araceli gárgaras con thuy mezcla de agua y sal 3 o 4 veces al día, o según sea necesario. Para preparar agua con sal, disuelva totalmente de ½ a 1 cucharadita (de 3 a 6 g) de sal en 1 taza (237 ml) de agua tibia.  Concurra a todas las visitas de seguimiento. Terril es importante.  Comuníquese con un dentista si:  Padece algún dolor dental inexplicable.  El dolor no se federico con los medicamentos.  Carol síntomas empeoran.  Aparecen nuevos síntomas.  Solicite ayuda de inmediato si:  No puede abrir la boca.  Tiene problemas para respirar o tragar.  Tiene fiebre.  Observa que tiene hinchazón en la russell, el rebeca o la mandíbula.  Estos síntomas pueden representar un problema grave que constituye thuy emergencia. No espere a karina si los síntomas desaparecen. Solicite atención médica de inmediato. Comuníquese con el servicio de emergencias de palacio localidad (911 en los [place]). No conduzca por carol propios medios hasta el hospital.    Resumen  Las causas del dolor dental pueden ser muchas, entre ellas, thuy caries y thuy infección.  El dolor puede ser leve o intenso.  Navarino los medicamentos de venta melonie y los recetados solamente gene se lo haya indicado el dentista.  Controle el dolor dental a fin de detectar algún cambio. Informe a palacio dentista si los síntomas empeoran.

## 2024-03-13 NOTE — ED PROVIDER NOTE - PATIENT PORTAL LINK FT
You can access the FollowMyHealth Patient Portal offered by [facility] by registering at the following website: [email or web address]. By joining [employer]’s FollowMyHealth portal, you will also be able to view your health information using other applications (apps) compatible with our system.

## 2024-03-13 NOTE — ED PROVIDER NOTE - PROGRESS NOTE DETAILS
AY: Spoke with OMFS resident Dr. Joyce, states patient should get CT maxillofacial and reconsult OMFS or dental as appropriate based on results. Patient made aware and amenable to plan

## 2024-03-13 NOTE — ED PROVIDER NOTE - PHYSICAL EXAMINATION
Physical Exam    Vital Signs: I have reviewed the initial vital signs.  Constitutional: appears stated age, no acute distress  Eyes: Conjunctiva pink, Sclera clear, PERRLA, EOMI.  ENT: OP is clear without exudates, + ecchymosis to gingiva between tooth 24 and 25 that is tender to palpation, +superficial abrasion to inner lower lip, tongue without swelling  Cardiovascular: S1 and S2, regular rate, regular rhythm, well-perfused extremities, radial pulses equal and 2+, pedal pulses 2+ and equal  Respiratory: unlabored respiratory effort, clear to auscultation bilaterally no wheezing, rales, or rhonchi  Gastrointestinal:  abdomen soft, non-tender  Musculoskeletal: supple neck, no lower extremity edema, no bony tenderness  Integumentary: warm, dry, no rash  Neurologic: awake, alert, oriented x3, extremities’ motor and sensory functions grossly intact, cranial nerves II-XII grossly intact
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
## 2024-03-13 NOTE — ED PROVIDER NOTE - NSFOLLOWUPCLINICS_GEN_ALL_ED_FT
[facility]  Dental  [address] [place] [id number]  Phone: [phone]  Fax:   Follow Up Time: 1-3 Days
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
## 2024-03-13 NOTE — ED PROVIDER NOTE - CLINICAL SUMMARY MEDICAL DECISION MAKING FREE TEXT BOX
Patient without acute fracture on CT. Wound care performed, patient counseled about infection precautions, use of abx, analgesia, dental follow up, return precautions.
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
## 2025-03-03 NOTE — ED PROVIDER NOTE - WR ORDER STATUS 1
Jimmy Kay is a 23 year old male who presents as a new patient to establish.     HPI:   Pt complains of approx 2 week history of dull ache type pain to left scrotal area. Reports he also felt mass along top of testes recently as well. Reports pain is worse in some positions such as laying on right side and resolves in other positions but is typically constantly present. Denies fever/chills, dysuria, urgency, frequency, hematuria, abd/flank pain, N/V/D. Denies dyspareunia or pain with ejaculation. Has not been treating with anything    Denies surgical hiustory. Denies other medical history.     Is student at [employer]. Will be graduating this spring with degree in engineering  for [employer]. 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   Is not  with no children. Feels he eats a healthy diet. Does exercise routinely. Reports is non-smoker. Drinks 0-3 alcoholic drinks per week.     Colon cancer screening: N/A    Wt Readings from Last 6 Encounters:   03/03/25 147 lb (66.7 kg)   04/09/19 116 lb (52.6 kg) (13%, Z= -1.14)*     * Growth percentiles are based on CDC (Boys, 2-20 Years) data.       No results found for: \"CHOLEST\", \"HDL\", \"LDL\", \"TRIGLY\", \"AST\", \"ALT\"   No current outpatient medications on file.      History reviewed. No pertinent past medical history.   History reviewed. No pertinent surgical history.   History reviewed. No pertinent family history.   Social History     Socioeconomic History    Marital status: Single   Tobacco Use    Smoking status: Never    Smokeless tobacco: Never   Vaping Use    Vaping status: Never Used   Substance and Sexual Activity    Alcohol use: Yes     Alcohol/week: 2.0 standard drinks of alcohol     Types: 2 Cans of beer per week    Drug use: Never   Other Topics Concern    Caffeine Concern No    Exercise No    Seat Belt No    Special Diet No    Stress Concern No    Weight Concern No      Social  History: as above     Health Maintenance  Immunizations: Recommend flu vaccine for upcoming flu season.        There is no immunization history on file for this patient.  Dental Visits:  Regularly    REVIEW OF SYSTEMS:   GENERAL: feels well otherwise  HENT: denies nasal congestion, sinus pain/pressure or ear discomfort  LUNGS: denies shortness of breath with exertion  CARDIOVASCULAR: denies chest pain on exertion, palpitations  GI: denies abdominal pain,denies heartburn  NEURO: denies headaches, dizziness, lightheadedness    EXAM:   /76   Pulse 87   Temp 96.9 °F (36.1 °C)   Resp 18   Ht 5' 10\" (1.778 m)   Wt 147 lb (66.7 kg)   SpO2 99%   BMI 21.09 kg/m²   Body mass index is 21.09 kg/m².   GENERAL: well developed, well nourished,in no apparent distress  SKIN: no rashes,no suspicious lesions  HEENT: atraumatic and normocephalic  NECK: supple, w/o limited ROM  LUNGS: clear to auscultation bilaterally. Effort non-labored.  CARDIO: RRR without murmur.   GI: soft, non-distended, non-TTP w/o masses, organomegaly or hernias.  : Bilat descended testes are smooth, non-tender, w/o masses/nodules. Scrotum w/o edema, erythema, masses or TTP. No penile lesions. No hernias felt  NEURO: Oriented times three, motor/sensory function intact. Follows commands appropriately.    ASSESSMENT AND PLAN:       Encounter Diagnosis   Name Primary?    Scrotal pain- likely varicocele or hydrocele. In office U/A normal, no evidence of infection. Check scrotal US, management dependent on results. Verbalized understanding of instructions and agreeable to this plan of care.    Yes       Orders Placed This Encounter   Procedures    Urine Dip, auto without Micro       Meds & Refills for this Visit:  Requested Prescriptions      No prescriptions requested or ordered in this encounter       Imaging & Consults:  US SCROTUM W/ DOPPLER (CPT=93975/60393)    No follow-ups on file.  There are no Patient Instructions on file for this  visit.         Performed Resulted

## 2025-04-18 ENCOUNTER — EMERGENCY (EMERGENCY)
Facility: HOSPITAL | Age: 40
LOS: 0 days | Discharge: HOME | End: 2025-04-18
Attending: STUDENT IN AN ORGANIZED HEALTH CARE EDUCATION/TRAINING PROGRAM
Payer: MEDICAID

## 2025-04-18 VITALS
TEMPERATURE: 98 F | OXYGEN SATURATION: 98 % | HEIGHT: 61 IN | RESPIRATION RATE: 18 BRPM | SYSTOLIC BLOOD PRESSURE: 137 MMHG | WEIGHT: 160.06 LBS | HEART RATE: 80 BPM | DIASTOLIC BLOOD PRESSURE: 87 MMHG

## 2025-04-18 DIAGNOSIS — K03.81 CRACKED TOOTH: ICD-10-CM

## 2025-04-18 DIAGNOSIS — E11.9 TYPE 2 DIABETES MELLITUS WITHOUT COMPLICATIONS: ICD-10-CM

## 2025-04-18 DIAGNOSIS — K08.89 OTHER SPECIFIED DISORDERS OF TEETH AND SUPPORTING STRUCTURES: ICD-10-CM

## 2025-04-18 PROCEDURE — 99283 EMERGENCY DEPT VISIT LOW MDM: CPT | Mod: 25

## 2025-04-18 PROCEDURE — 99283 EMERGENCY DEPT VISIT LOW MDM: CPT

## 2025-04-18 PROCEDURE — 96372 THER/PROPH/DIAG INJ SC/IM: CPT

## 2025-04-18 RX ORDER — KETOROLAC TROMETHAMINE 30 MG/ML
30 INJECTION, SOLUTION INTRAMUSCULAR; INTRAVENOUS ONCE
Refills: 0 | Status: DISCONTINUED | OUTPATIENT
Start: 2025-04-18 | End: 2025-04-18

## 2025-04-18 RX ORDER — AMOXICILLIN 500 MG/1
1 CAPSULE ORAL
Qty: 14 | Refills: 0
Start: 2025-04-18 | End: 2025-04-24

## 2025-04-18 RX ADMIN — KETOROLAC TROMETHAMINE 30 MILLIGRAM(S): 30 INJECTION, SOLUTION INTRAMUSCULAR; INTRAVENOUS at 02:34

## 2025-04-18 NOTE — ED PROVIDER NOTE - OBJECTIVE STATEMENT
20-month-old male past medical history significant for GERD, laryngeal malacia, reactive airway disease 31-year-old male with past medical history of diabetes presenting with left lower molar dental pain.  Patient reports his tooth has been broken for a while but began hurting yesterday around 8 PM.  Pain has been worsening.  Patient took Tylenol at 12 AM with no relief in symptoms.  Patient denies any fevers or facial swelling.  Patient has no other complaints at this time.  Patient has not followed up with a dentist yet. 39-year-old male with past medical history of diabetes presenting with left lower molar dental pain.  Patient reports his tooth has been broken for a while but began hurting yesterday around 8 PM.  Pain has been worsening.  Patient took Tylenol at 12 AM with no relief in symptoms.  Patient denies any fevers or facial swelling.  Patient has no other complaints at this time.  Patient has not followed up with a dentist yet.

## 2025-04-18 NOTE — ED PROVIDER NOTE - NSFOLLOWUPCLINICS_GEN_ALL_ED_FT
[facility]  Dental  [address] [place] [id number]  Phone: [phone]  Fax:

## 2025-04-18 NOTE — ED PROVIDER NOTE - PATIENT PORTAL LINK FT
You can access the FollowMyHealth Patient Portal offered by [facility] by registering at the following website: [email or web address]. By joining [employer]’s FollowMyHealth portal, you will also be able to view your health information using other applications (apps) compatible with our system.

## 2025-04-18 NOTE — ED PROVIDER NOTE - CLINICAL SUMMARY MEDICAL DECISION MAKING FREE TEXT BOX
dental pain # 16. airway intact.  no abscess. neck supple, no submandibular erythema. no ludwigs. abx sent to pharmacy. follow up with dental clinic.

## 2025-04-18 NOTE — ED PROVIDER NOTE - NSFOLLOWUPINSTRUCTIONS_ED_ALL_ED_FT
Our Emergency Department Referral Coordinators will be reaching out to you in the next 24-48 hours from 9:00am to 5:00pm with a follow up appointment. Please expect a phone call from the hospital in that time frame. If you do not receive a call or if you have any questions or concerns, you can reach them at [phone]      Toothache    WHAT YOU NEED TO KNOW:    A toothache is pain that is caused by irritation of the nerves in the center of your tooth. The irritation may be caused by several problems, such as a cavity, an infection, a cracked tooth, or gum disease.  Tooth Anatomy    DISCHARGE INSTRUCTIONS:    Return to the emergency department if:    You have trouble breathing or swallowing.    You have swelling in your face or neck.  Contact your dentist if:    You have a fever and chills.    You have trouble opening or closing your mouth.    You have swelling around your tooth.    You have questions or concerns about your condition or care.  Medicines: You may need any of the following:    NSAIDs, such as ibuprofen, help decrease swelling, pain, and fever. This medicine is available with or without a doctor's order. NSAIDs can cause stomach bleeding or kidney problems in certain people. If you take blood thinner medicine, always ask if NSAIDs are safe for you. Always read the medicine label and follow directions. Do not give these medicines to children younger than 6 months without direction from a healthcare provider.    Acetaminophen decreases pain and fever. It is available without a doctor's order. Ask how much to take and how often to take it. Follow directions. Acetaminophen can cause liver damage if not taken correctly.    Prescription pain medicine may be given. Ask your healthcare provider how to take this medicine safely. Some prescription pain medicines contain acetaminophen. Do not take other medicines that contain acetaminophen without talking to your healthcare provider. Too much acetaminophen may cause liver damage. Prescription pain medicine may cause constipation. Ask your healthcare provider how to prevent or treat constipation.    Antibiotics help treat or prevent a bacterial infection.    Take your medicine as directed. Contact your healthcare provider if you think your medicine is not helping or if you have side effects. Tell your provider if you are allergic to any medicine. Keep a list of the medicines, vitamins, and herbs you take. Include the amounts, and when and why you take them. Bring the list or the pill bottles to follow-up visits. Carry your medicine list with you in case of an emergency.  Self-care:    Rinse your mouth with warm salt water 4 times a day or as directed.    Eat soft foods to help relieve pain caused by chewing.    Apply ice on your jaw or cheek for 15 to 20 minutes every hour or as directed. Use an ice pack, or put crushed ice in a plastic bag. Cover it with a towel before you apply it. Ice helps prevent tissue damage and decreases swelling and pain.  Help prevent a toothache:    Brush your teeth at least 2 times a day.    Use dental floss to clean between your teeth at least 1 time a day.    See your dentist regularly every 6 months for dental cleanings and oral exams.  Follow up with your dentist as directed: You may be referred to a dental surgeon. Write down your questions so you remember to ask them during your visits.

## 2025-04-18 NOTE — ED ADULT TRIAGE NOTE - CHIEF COMPLAINT QUOTE
Presents to ED c/o L lower toothache since yesterday, worsening today. Pt endorses mild swelling, denies difficulty swallowing, breathing.

## 2025-04-18 NOTE — ED PROVIDER NOTE - PHYSICAL EXAMINATION
Constitutional: Well developed, well nourished, no acute distress  Head: Normocephalic, Atraumatic  Eyes: PERRLA, EOMI, conjunctiva and sclera WNL  ENT: Moist mucous membranes, no rhinorrhea, Clear oropharynx, left lower molar broken with no surrounding abscess, no facial swelling  Neck: Supple, Nontender,   Respiratory: Normal chest excursion with respiration; Breath sounds clear and equal B/L; No wheezes, rales, or rhonchi   Cardiovascular: RRR; Normal S1, S2; No murmurs, rubs or gallops
